# Patient Record
Sex: FEMALE | Race: WHITE
[De-identification: names, ages, dates, MRNs, and addresses within clinical notes are randomized per-mention and may not be internally consistent; named-entity substitution may affect disease eponyms.]

---

## 2018-07-20 ENCOUNTER — APPOINTMENT (OUTPATIENT)
Dept: OBGYN | Facility: CLINIC | Age: 28
End: 2018-07-20
Payer: MEDICAID

## 2018-07-20 VITALS
HEIGHT: 65 IN | BODY MASS INDEX: 22.49 KG/M2 | WEIGHT: 135 LBS | SYSTOLIC BLOOD PRESSURE: 109 MMHG | DIASTOLIC BLOOD PRESSURE: 64 MMHG

## 2018-07-20 DIAGNOSIS — Z78.9 OTHER SPECIFIED HEALTH STATUS: ICD-10-CM

## 2018-07-20 DIAGNOSIS — Z83.3 FAMILY HISTORY OF DIABETES MELLITUS: ICD-10-CM

## 2018-07-20 DIAGNOSIS — Z87.42 PERSONAL HISTORY OF OTHER DISEASES OF THE FEMALE GENITAL TRACT: ICD-10-CM

## 2018-07-20 DIAGNOSIS — Z82.49 FAMILY HISTORY OF ISCHEMIC HEART DISEASE AND OTHER DISEASES OF THE CIRCULATORY SYSTEM: ICD-10-CM

## 2018-07-20 LAB
BILIRUB UR QL STRIP: NORMAL
COLLECTION METHOD: NORMAL
GLUCOSE UR-MCNC: NORMAL
HCG UR QL: 0.2 EU/DL
HGB UR QL STRIP.AUTO: ABNORMAL
KETONES UR-MCNC: NORMAL
LEUKOCYTE ESTERASE UR QL STRIP: NORMAL
NITRITE UR QL STRIP: NORMAL
PH UR STRIP: 6
PROT UR STRIP-MCNC: ABNORMAL
SP GR UR STRIP: 1.02

## 2018-07-20 PROCEDURE — 81003 URINALYSIS AUTO W/O SCOPE: CPT | Mod: QW

## 2018-07-20 PROCEDURE — 99385 PREV VISIT NEW AGE 18-39: CPT

## 2018-07-23 LAB
C TRACH RRNA SPEC QL NAA+PROBE: NOT DETECTED
N GONORRHOEA RRNA SPEC QL NAA+PROBE: NOT DETECTED
SOURCE TP AMPLIFICATION: NORMAL

## 2018-07-25 LAB — CYTOLOGY CVX/VAG DOC THIN PREP: NORMAL

## 2020-02-11 ENCOUNTER — APPOINTMENT (OUTPATIENT)
Dept: OBGYN | Facility: CLINIC | Age: 30
End: 2020-02-11
Payer: MEDICAID

## 2020-02-11 VITALS
HEIGHT: 65 IN | SYSTOLIC BLOOD PRESSURE: 100 MMHG | DIASTOLIC BLOOD PRESSURE: 60 MMHG | WEIGHT: 138 LBS | BODY MASS INDEX: 22.99 KG/M2

## 2020-02-11 DIAGNOSIS — N39.0 URINARY TRACT INFECTION, SITE NOT SPECIFIED: ICD-10-CM

## 2020-02-11 PROCEDURE — 81003 URINALYSIS AUTO W/O SCOPE: CPT | Mod: QW

## 2020-02-11 PROCEDURE — 99213 OFFICE O/P EST LOW 20 MIN: CPT

## 2020-02-11 NOTE — PHYSICAL EXAM
[Normal] : uterus [Motion Tenderness] : there was cervical motion tenderness [No Bleeding] : there was no active vaginal bleeding [Uterine Adnexae] : were not tender and not enlarged [Enlarged ___ wks] : not enlarged [Tenderness] : nontender [Adnexa Tenderness] : were not tender [de-identified] : NO CVAT, ABDOMEN SOFT WITH SUPRAPUBIC TENDERNESS, FULL BLADDER

## 2020-02-11 NOTE — CHIEF COMPLAINT
[Urgent Visit] : Urgent Visit [FreeTextEntry1] : SEEN AT URGENT CARE 2 DAYS AGO WITH DARK BROWN, "DIRTY" URINE. GIVEN PO KEFLEX, PYRIDIUM, STARTED YESTERDAY MORNING.  TAKING AZO AS WELL.  SOME RELIEF.  GROSS HEMATURIA AT END OF BLOOD STREAM.\par \par IS AT MID-CYCLE.  DENIES POLYDYPSIA, POLYURIA, POLYPHAGIA.  \par \par GLUCOSURIA TODAY.  HASN'T HAD SEX IN ABOUT 10 DAYS.

## 2020-02-13 LAB
APPEARANCE: CLEAR
BACTERIA UR CULT: NORMAL
BACTERIA: NEGATIVE
BILIRUB UR QL STRIP: NORMAL
BILIRUBIN URINE: ABNORMAL
BLOOD URINE: NEGATIVE
CLARITY UR: CLEAR
COLLECTION METHOD: NORMAL
COLOR: ABNORMAL
GLUCOSE QUALITATIVE U: NEGATIVE
GLUCOSE UR-MCNC: NORMAL
HCG UR QL: 1 EU/DL
HGB UR QL STRIP.AUTO: NORMAL
HYALINE CASTS: 2 /LPF
KETONES UR-MCNC: NORMAL
KETONES URINE: NEGATIVE
LEUKOCYTE ESTERASE UR QL STRIP: NORMAL
LEUKOCYTE ESTERASE URINE: NEGATIVE
MICROSCOPIC-UA: NORMAL
NITRITE UR QL STRIP: POSITIVE
NITRITE URINE: POSITIVE
PH UR STRIP: 5.5
PH URINE: 6.5
PROT UR STRIP-MCNC: NORMAL
PROTEIN URINE: NEGATIVE
RED BLOOD CELLS URINE: 1 /HPF
SP GR UR STRIP: 1.02
SPECIFIC GRAVITY URINE: 1.01
SQUAMOUS EPITHELIAL CELLS: 1 /HPF
UROBILINOGEN URINE: ABNORMAL
WHITE BLOOD CELLS URINE: 0 /HPF

## 2020-03-20 ENCOUNTER — APPOINTMENT (OUTPATIENT)
Dept: OBGYN | Facility: CLINIC | Age: 30
End: 2020-03-20

## 2020-09-23 ENCOUNTER — APPOINTMENT (OUTPATIENT)
Dept: OBGYN | Facility: CLINIC | Age: 30
End: 2020-09-23
Payer: MEDICAID

## 2020-09-23 VITALS
HEIGHT: 65 IN | WEIGHT: 128 LBS | SYSTOLIC BLOOD PRESSURE: 108 MMHG | DIASTOLIC BLOOD PRESSURE: 64 MMHG | BODY MASS INDEX: 21.33 KG/M2

## 2020-09-23 DIAGNOSIS — N90.89 OTHER SPECIFIED NONINFLAMMATORY DISORDERS OF VULVA AND PERINEUM: ICD-10-CM

## 2020-09-23 DIAGNOSIS — Z01.419 ENCOUNTER FOR GYNECOLOGICAL EXAMINATION (GENERAL) (ROUTINE) W/OUT ABNORMAL FINDINGS: ICD-10-CM

## 2020-09-23 LAB
HCG UR QL: NEGATIVE
QUALITY CONTROL: YES

## 2020-09-23 PROCEDURE — 99395 PREV VISIT EST AGE 18-39: CPT

## 2020-09-23 PROCEDURE — 81025 URINE PREGNANCY TEST: CPT

## 2020-09-23 RX ORDER — PHENAZOPYRIDINE 200 MG/1
TABLET, FILM COATED ORAL
Refills: 0 | Status: COMPLETED | COMMUNITY
End: 2020-09-23

## 2020-09-23 RX ORDER — TOBRAMYCIN 3 MG/ML
0.3 SOLUTION/ DROPS OPHTHALMIC
Qty: 5 | Refills: 0 | Status: COMPLETED | COMMUNITY
Start: 2018-06-27 | End: 2020-09-23

## 2020-09-23 RX ORDER — DEXAMETHASONE 4 MG/1
TABLET ORAL
Refills: 0 | Status: COMPLETED | COMMUNITY
End: 2020-09-23

## 2020-09-23 RX ORDER — DEXTROAMPHETAMINE SACCHARATE, AMPHETAMINE ASPARTATE, DEXTROAMPHETAMINE SULFATE, AND AMPHETAMINE SULFATE 2.5; 2.5; 2.5; 2.5 MG/1; MG/1; MG/1; MG/1
10 TABLET ORAL
Refills: 0 | Status: ACTIVE | COMMUNITY
Start: 2020-09-23

## 2020-09-23 RX ORDER — PODOFILOX 5 MG/G
0.5 GEL TOPICAL TWICE DAILY
Qty: 1 | Refills: 1 | Status: ACTIVE | COMMUNITY
Start: 2020-09-23 | End: 1900-01-01

## 2020-09-23 RX ORDER — ETONOGESTREL AND ETHINYL ESTRADIOL .12; .015 MG/D; MG/D
0.12-0.015 INSERT, EXTENDED RELEASE VAGINAL
Qty: 3 | Refills: 0 | Status: COMPLETED | COMMUNITY
Start: 2018-07-20 | End: 2020-09-23

## 2020-09-23 NOTE — PLAN
[FreeTextEntry1] : OFFERED BLOOD WORK FOR STI\par PRIMARY IS SENDING HER FOR STI PANEL, PER PT\par \par BREASTS ARE VERY LUMPY, THROUGHOUT\par ALLMOBILE, NO REDNESS OR RETRACTION

## 2020-09-23 NOTE — COUNSELING
[Nutrition/ Exercise/ Weight Management] : nutrition, exercise, weight management [Vitamins/Supplements] : vitamins/supplements [Sunscreen] : sunscreen [Breast Self Exam] : breast self exam [Contraception/ Emergency Contraception/ Safe Sexual Practices] : contraception, emergency contraception, safe sexual practices [STD (testing, results, tx)] : STD (testing, results, tx) [Vaccines] : vaccines

## 2020-09-23 NOTE — PHYSICAL EXAM
[Appropriately responsive] : appropriately responsive [Alert] : alert [No Acute Distress] : no acute distress [No Lymphadenopathy] : no lymphadenopathy [Regular Rate Rhythm] : regular rate rhythm [No Murmurs] : no murmurs [Clear to Auscultation B/L] : clear to auscultation bilaterally [Soft] : soft [Non-tender] : non-tender [Non-distended] : non-distended [No HSM] : No HSM [No Lesions] : no lesions [No Mass] : no mass [Oriented x3] : oriented x3 [Examination Of The Breasts] : a normal appearance [No Masses] : no breast masses were palpable [Labia Majora] : normal [Labia Minora] : normal [Normal] : normal [Uterine Adnexae] : normal [Breast Palpation Diffuse Fibrous Tissue Bilateral] : fibrocystic changes [FreeTextEntry1] : JUST ABOVE CLIT ON RIGHT SIDE SMALL WART OR SKIN TAG ON PEDICLE LESS THAN 1 CM

## 2020-09-23 NOTE — HISTORY OF PRESENT ILLNESS
[Patient reported PAP Smear was normal] : Patient reported PAP Smear was normal [FreeTextEntry1] : Come concern with utis this past year, has had about 4.\par Has no symptoms today\par For annual, pap and would like skin tag checked which she things has changed recently\par hAD UNPROTECTED INTERCOURSE ABOUT 2 WEEKS AGO AND IS CONCERNED. [TextBox_4] : 29 YO FEMALE,HERE FOR ANNUAL EXAM   HAS BEEN WELL SINCE LAST SEEN OVER 2 YEARS AGO.  IS NO LONGER ON NUVARING, USES CONDOMS INTERMITTENTLY,FOR PREGNANCY PREVENTION\par HER LAST ANNUAL EXAM WAS: 7/20/18\par \par PREGNANCY HISTORY:  TOTAL   1   LIVE BIRTHS:      SAB:      IAB:1\par \par SEXUALLY ACTIVE: YES\par LENGTH OF TIME IN RELATIONSHIP:  PT OUT OF LONG TERM RELATIONSHIP, HAS HAD 3 INTERVAL PARTNERS, LAST INTERCOURSE 2 WEEKS AGO, UNPROTECTED.  USED EC\par \par MEDICAL HISTORY INCLUDES: ADHD\par FAMILY HISTORY IS SIGNIFICANT FOR: HYPOTHYROIDISM, DIABETES\par \par LAST VISIT WITH PRIMARY DOCTOR: AUG 2020\par LAST BLOOD WORK: BEING SENT FOR BLOOD WORK\par \par NUTRITIONAL INFO:TRIES TO EAT HEALTHFULLY, HARDER SINCE WORKING NIGHTS.  HAS YOGURT DAILY, AND CHEESE REGULARLY\par CALCIUM INTAKE:SUPPLEMENTS: NO\par \par \par EXERCISES: WALKS DOG REGULARLY, ON FEET ALOT, IS LPN.\par  [PapSmeardate] : 7/20/18 [GonorrheaDate] : 7/20/18 [ChlamydiaDate] : 7/20/18

## 2020-09-28 LAB — CYTOLOGY CVX/VAG DOC THIN PREP: ABNORMAL

## 2020-10-01 DIAGNOSIS — Z30.011 ENCOUNTER FOR INITIAL PRESCRIPTION OF CONTRACEPTIVE PILLS: ICD-10-CM

## 2020-12-04 DIAGNOSIS — Z30.015 ENCOUNTER FOR INITIAL PRESCRIPTION OF VAGINAL RING HORMONAL CONTRACEPTIVE: ICD-10-CM

## 2020-12-04 RX ORDER — NORGESTIMATE AND ETHINYL ESTRADIOL 7DAYSX3 LO
0.18/0.215/0.25 KIT ORAL
Qty: 84 | Refills: 1 | Status: COMPLETED | COMMUNITY
Start: 2020-10-01 | End: 2020-12-04

## 2020-12-04 RX ORDER — ETONOGESTREL AND ETHINYL ESTRADIOL 11.7; 2.7 MG/1; MG/1
0.12-0.015 INSERT, EXTENDED RELEASE VAGINAL
Qty: 1 | Refills: 1 | Status: ACTIVE | COMMUNITY
Start: 2020-12-04 | End: 1900-01-01

## 2020-12-23 PROBLEM — N39.0 ACUTE UTI: Status: RESOLVED | Noted: 2020-02-11 | Resolved: 2020-12-23

## 2021-01-25 ENCOUNTER — APPOINTMENT (OUTPATIENT)
Dept: OBGYN | Facility: CLINIC | Age: 31
End: 2021-01-25
Payer: MEDICAID

## 2021-01-25 VITALS
TEMPERATURE: 97.9 F | WEIGHT: 124 LBS | SYSTOLIC BLOOD PRESSURE: 112 MMHG | HEIGHT: 65 IN | BODY MASS INDEX: 20.66 KG/M2 | DIASTOLIC BLOOD PRESSURE: 62 MMHG

## 2021-01-25 DIAGNOSIS — N92.6 IRREGULAR MENSTRUATION, UNSPECIFIED: ICD-10-CM

## 2021-01-25 LAB
HCG UR QL: NEGATIVE
QUALITY CONTROL: YES

## 2021-01-25 PROCEDURE — 99072 ADDL SUPL MATRL&STAF TM PHE: CPT

## 2021-01-25 PROCEDURE — 99212 OFFICE O/P EST SF 10 MIN: CPT

## 2021-01-25 PROCEDURE — 81025 URINE PREGNANCY TEST: CPT

## 2021-01-25 RX ORDER — FLUTICASONE PROPIONATE 50 UG/1
50 SPRAY, METERED NASAL
Qty: 16 | Refills: 0 | Status: COMPLETED | COMMUNITY
Start: 2020-10-16

## 2021-01-25 RX ORDER — TERCONAZOLE 4 MG/G
0.4 CREAM VAGINAL
Qty: 45 | Refills: 0 | Status: COMPLETED | COMMUNITY
Start: 2020-10-26

## 2021-01-25 RX ORDER — FLUCONAZOLE 150 MG/1
150 TABLET ORAL
Qty: 2 | Refills: 0 | Status: COMPLETED | COMMUNITY
Start: 2020-10-26

## 2021-01-25 NOTE — PLAN
[FreeTextEntry1] : Will await results of affirm to treat\par Continue probiotics, nonlubricated condom

## 2021-01-25 NOTE — HISTORY OF PRESENT ILLNESS
[Patient reported PAP Smear was normal] : Patient reported PAP Smear was normal [Vagina] : vagina [VV Cream] : VV cream [Irregular Menstrual Interval] : irregular menstrual interval [FreeTextEntry1] : Pt has been with new partner since August [TextBox_4] : 31 YO FEMALE HAD ANNUAL EXAM DONE 9/23/20   sHE IS USING nUVARING [PapSmeardate] : 9/23/20 [GonorrheaDate] : 9/23/20 [ChlamydiaDate] : 9/23/20 [TextBox_36] : Pt states that discharge and odor started in Dec.  symptoms are on and off, has used boric acid twice with good relieft.  pt feels pH is off\par  She had terrible yeast infection in Nov. [TextBox_28] : Periods were monthly and regular until pt started ocp in Sept.  Oct menses were wnl, and in Nov got it Nov 9-24.  Called and stopped the pills\par Dec menses Dec 18-21, Jan 8-12   has been using condoms regularly.  never started nuvaring

## 2021-01-25 NOTE — REASON FOR VISIT
[Follow-Up] : a follow-up evaluation of [FreeTextEntry2] : irregular menses since nov 2020, foul odor per vagiona

## 2021-01-25 NOTE — REVIEW OF SYSTEMS
[Patient Intake Form Reviewed] : Patient intake form was reviewed [Negative] : Heme/Lymph [Abn Vaginal bleeding] : abnormal vaginal bleeding [Urgency] : no urgency [Frequency] : no frequency [Urethral Discharge] : no urethral discharge [Pelvic pain] : no pelvic pain [FreeTextEntry8] : foul odor, discharge

## 2021-01-28 PROBLEM — N76.0 BV (BACTERIAL VAGINOSIS): Status: ACTIVE | Noted: 2021-01-28

## 2021-01-28 LAB
C TRACH RRNA SPEC QL NAA+PROBE: NOT DETECTED
CANDIDA VAG CYTO: NOT DETECTED
G VAGINALIS+PREV SP MTYP VAG QL MICRO: DETECTED
N GONORRHOEA RRNA SPEC QL NAA+PROBE: NOT DETECTED
SOURCE AMPLIFICATION: NORMAL
T VAGINALIS VAG QL WET PREP: NOT DETECTED

## 2021-02-03 ENCOUNTER — APPOINTMENT (OUTPATIENT)
Dept: OBGYN | Facility: CLINIC | Age: 31
End: 2021-02-03
Payer: MEDICAID

## 2021-02-03 DIAGNOSIS — B96.89 ACUTE VAGINITIS: ICD-10-CM

## 2021-02-03 DIAGNOSIS — N76.0 ACUTE VAGINITIS: ICD-10-CM

## 2021-02-12 ENCOUNTER — ASOB RESULT (OUTPATIENT)
Age: 31
End: 2021-02-12

## 2021-02-12 ENCOUNTER — APPOINTMENT (OUTPATIENT)
Dept: OBGYN | Facility: CLINIC | Age: 31
End: 2021-02-12
Payer: MEDICAID

## 2021-02-12 PROCEDURE — 99072 ADDL SUPL MATRL&STAF TM PHE: CPT

## 2021-02-12 PROCEDURE — 76830 TRANSVAGINAL US NON-OB: CPT

## 2021-02-18 ENCOUNTER — NON-APPOINTMENT (OUTPATIENT)
Age: 31
End: 2021-02-18

## 2022-10-24 ENCOUNTER — NON-APPOINTMENT (OUTPATIENT)
Age: 32
End: 2022-10-24

## 2023-04-18 ENCOUNTER — APPOINTMENT (OUTPATIENT)
Dept: OBGYN | Facility: CLINIC | Age: 33
End: 2023-04-18

## 2023-05-28 ENCOUNTER — NON-APPOINTMENT (OUTPATIENT)
Age: 33
End: 2023-05-28

## 2023-09-09 ENCOUNTER — NON-APPOINTMENT (OUTPATIENT)
Age: 33
End: 2023-09-09

## 2023-09-12 ENCOUNTER — APPOINTMENT (OUTPATIENT)
Dept: DERMATOLOGY | Facility: CLINIC | Age: 33
End: 2023-09-12
Payer: COMMERCIAL

## 2023-09-12 VITALS — HEIGHT: 65 IN | BODY MASS INDEX: 21.99 KG/M2 | WEIGHT: 132 LBS

## 2023-09-12 DIAGNOSIS — L23.9 ALLERGIC CONTACT DERMATITIS, UNSPECIFIED CAUSE: ICD-10-CM

## 2023-09-12 PROCEDURE — 99203 OFFICE O/P NEW LOW 30 MIN: CPT

## 2023-09-14 RX ORDER — MOMETASONE FUROATE 1 MG/G
0.1 OINTMENT TOPICAL
Qty: 1 | Refills: 2 | Status: ACTIVE | COMMUNITY
Start: 2023-09-14 | End: 1900-01-01

## 2023-09-14 RX ORDER — TRIAMCINOLONE ACETONIDE 0.5 MG/G
0.05 OINTMENT TOPICAL
Qty: 30 | Refills: 0 | Status: DISCONTINUED | COMMUNITY
Start: 2023-09-12 | End: 2023-09-14

## 2023-10-23 ENCOUNTER — APPOINTMENT (OUTPATIENT)
Dept: OPHTHALMOLOGY | Facility: CLINIC | Age: 33
End: 2023-10-23
Payer: COMMERCIAL

## 2023-10-23 ENCOUNTER — NON-APPOINTMENT (OUTPATIENT)
Age: 33
End: 2023-10-23

## 2023-10-23 PROCEDURE — 92025 CPTRIZED CORNEAL TOPOGRAPHY: CPT

## 2023-10-23 PROCEDURE — 92004 COMPRE OPH EXAM NEW PT 1/>: CPT

## 2023-11-03 ENCOUNTER — RESULT REVIEW (OUTPATIENT)
Age: 33
End: 2023-11-03

## 2023-11-03 ENCOUNTER — APPOINTMENT (OUTPATIENT)
Dept: MRI IMAGING | Facility: CLINIC | Age: 33
End: 2023-11-03
Payer: COMMERCIAL

## 2023-11-03 PROCEDURE — A9585: CPT

## 2023-11-03 PROCEDURE — 70543 MRI ORBT/FAC/NCK W/O &W/DYE: CPT

## 2023-11-07 ENCOUNTER — NON-APPOINTMENT (OUTPATIENT)
Age: 33
End: 2023-11-07

## 2023-11-07 ENCOUNTER — APPOINTMENT (OUTPATIENT)
Dept: OPHTHALMOLOGY | Facility: CLINIC | Age: 33
End: 2023-11-07
Payer: COMMERCIAL

## 2023-11-07 PROCEDURE — 92285 EXTERNAL OCULAR PHOTOGRAPHY: CPT

## 2023-11-07 PROCEDURE — 99214 OFFICE O/P EST MOD 30 MIN: CPT

## 2023-11-17 ENCOUNTER — NON-APPOINTMENT (OUTPATIENT)
Age: 33
End: 2023-11-17

## 2023-12-14 ENCOUNTER — APPOINTMENT (OUTPATIENT)
Dept: OBGYN | Facility: CLINIC | Age: 33
End: 2023-12-14

## 2023-12-15 ENCOUNTER — APPOINTMENT (OUTPATIENT)
Dept: OPHTHALMOLOGY | Facility: CLINIC | Age: 33
End: 2023-12-15

## 2024-02-09 ENCOUNTER — APPOINTMENT (OUTPATIENT)
Dept: OPHTHALMOLOGY | Facility: CLINIC | Age: 34
End: 2024-02-09
Payer: COMMERCIAL

## 2024-02-09 ENCOUNTER — NON-APPOINTMENT (OUTPATIENT)
Age: 34
End: 2024-02-09

## 2024-02-09 PROCEDURE — 99213 OFFICE O/P EST LOW 20 MIN: CPT

## 2024-02-09 PROCEDURE — 92285 EXTERNAL OCULAR PHOTOGRAPHY: CPT

## 2024-02-23 ENCOUNTER — NON-APPOINTMENT (OUTPATIENT)
Age: 34
End: 2024-02-23

## 2024-02-23 ENCOUNTER — APPOINTMENT (OUTPATIENT)
Dept: OPHTHALMOLOGY | Facility: CLINIC | Age: 34
End: 2024-02-23
Payer: COMMERCIAL

## 2024-02-23 PROCEDURE — 92285 EXTERNAL OCULAR PHOTOGRAPHY: CPT

## 2024-02-23 PROCEDURE — 99215 OFFICE O/P EST HI 40 MIN: CPT

## 2024-03-18 ENCOUNTER — APPOINTMENT (OUTPATIENT)
Dept: OPHTHALMOLOGY | Facility: HOSPITAL | Age: 34
End: 2024-03-18

## 2024-03-26 ENCOUNTER — APPOINTMENT (OUTPATIENT)
Dept: OPHTHALMOLOGY | Facility: CLINIC | Age: 34
End: 2024-03-26

## 2024-03-29 ENCOUNTER — NON-APPOINTMENT (OUTPATIENT)
Age: 34
End: 2024-03-29

## 2024-03-29 ENCOUNTER — APPOINTMENT (OUTPATIENT)
Dept: OBGYN | Facility: CLINIC | Age: 34
End: 2024-03-29
Payer: COMMERCIAL

## 2024-03-29 VITALS
BODY MASS INDEX: 22.03 KG/M2 | WEIGHT: 132.25 LBS | HEIGHT: 65 IN | SYSTOLIC BLOOD PRESSURE: 96 MMHG | DIASTOLIC BLOOD PRESSURE: 52 MMHG

## 2024-03-29 DIAGNOSIS — N89.8 OTHER SPECIFIED NONINFLAMMATORY DISORDERS OF VAGINA: ICD-10-CM

## 2024-03-29 DIAGNOSIS — Z12.4 ENCOUNTER FOR SCREENING FOR MALIGNANT NEOPLASM OF CERVIX: ICD-10-CM

## 2024-03-29 DIAGNOSIS — Z11.3 ENCOUNTER FOR SCREENING FOR INFECTIONS WITH A PREDOMINANTLY SEXUAL MODE OF TRANSMISSION: ICD-10-CM

## 2024-03-29 DIAGNOSIS — N63.10 UNSPECIFIED LUMP IN THE RIGHT BREAST, UNSPECIFIED QUADRANT: ICD-10-CM

## 2024-03-29 DIAGNOSIS — Z01.411 ENCOUNTER FOR GYNECOLOGICAL EXAMINATION (GENERAL) (ROUTINE) WITH ABNORMAL FINDINGS: ICD-10-CM

## 2024-03-29 PROCEDURE — 99385 PREV VISIT NEW AGE 18-39: CPT

## 2024-03-29 PROCEDURE — 36415 COLL VENOUS BLD VENIPUNCTURE: CPT

## 2024-03-29 PROCEDURE — 99203 OFFICE O/P NEW LOW 30 MIN: CPT | Mod: 25

## 2024-03-29 NOTE — PLAN
[FreeTextEntry1] : Right Breast Masses:  Script for diagnostic mammogram given to patient.  Pt was recommended to have a diagnostic mammogram and sonogram to further evaluate the palpable nodules.  We discussed potential outcomes of imaging including benign findings, findings which require close interval follow up, suspicious findings which require biopsy, and also the possibility the imaging will not identify the nodule.  We discussed the need for breast surgeon follow up pending results of the imaging. Will follow up with patient on results.  Pt verbalizes understanding.     Cervical Screening:  Pap smear completed. Will follow up with patient on results.   STI testing:  Gonorrhea/ chlamydia from Pap.  Blood work for Hep B, syphilis, HIV.  Will follow up with patient on results.   Odor:  BV panel sent.  Will follow up with patient on results.    Educated pt on importance of healthy diet and exercise.   Declined Gardasil vaccine.   All questions and concerns addressed.

## 2024-03-29 NOTE — PHYSICAL EXAM
[Chaperone Present] : A chaperone was present in the examining room during all aspects of the physical examination [Appropriately responsive] : appropriately responsive [Mass] : mass [Non-tender] : non-tender [Non-distended] : non-distended [Oriented x3] : oriented x3 [Examination Of The Breasts] : a normal appearance [] : in the 9:00 position [___cm] : a ~M [unfilled] ~Ucm superior medial quadrant mass was palpated [Deep] : deep [Soft] : soft [Mobile] : mobile [Nontender] : nontender [12:00] : in the 12:00 position [___cm Radial Distance from the Nipple] : [unfilled] ~Ucm radially from the nipple [Labia Majora] : normal [Labia Minora] : normal [Discharge] : discharge [Moderate] : moderate [White] : white [Thick] : thick [Normal] : normal [Normal Position] : in a normal position [Uterine Adnexae] : normal [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

## 2024-03-29 NOTE — HISTORY OF PRESENT ILLNESS
[Y] : Positive pregnancy history [Menarche Age: ____] : age at menarche was [unfilled] [No] : Patient does not have concerns regarding sex [N] : Patient is not sexually active [Previously active] : previously active [PapSmeardate] : 09/23/2020 [TextBox_31] : NEGATIVE [GonorrheaDate] : 01/25/2021 [TextBox_63] : NEGATIVE [ChlamydiaDate] : 01/25/2021 [TextBox_68] : NEGATIVE [LMPDate] : 03/09/2024 [PGHxTotal] : 2 [PGHxABInduced] : 2 [FreeTextEntry1] : 03/09/2024

## 2024-03-30 LAB
CANDIDA VAG CYTO: NOT DETECTED
G VAGINALIS+PREV SP MTYP VAG QL MICRO: DETECTED
HAV IGM SER QL: NONREACTIVE
HBV CORE IGM SER QL: NONREACTIVE
HBV SURFACE AG SER QL: NONREACTIVE
HCV AB SER QL: NONREACTIVE
HCV S/CO RATIO: 0.09 S/CO
HIV1+2 AB SPEC QL IA.RAPID: NONREACTIVE
T PALLIDUM AB SER QL IA: NEGATIVE
T VAGINALIS VAG QL WET PREP: NOT DETECTED

## 2024-04-03 RX ORDER — METRONIDAZOLE 7.5 MG/G
0.75 GEL VAGINAL
Qty: 1 | Refills: 0 | Status: ACTIVE | COMMUNITY
Start: 2021-01-28

## 2024-04-10 LAB
C TRACH RRNA SPEC QL NAA+PROBE: NOT DETECTED
CYTOLOGY CVX/VAG DOC THIN PREP: ABNORMAL
HPV HIGH+LOW RISK DNA PNL CVX: NOT DETECTED
N GONORRHOEA RRNA SPEC QL NAA+PROBE: NOT DETECTED
SOURCE TP AMPLIFICATION: NORMAL

## 2024-04-18 ENCOUNTER — APPOINTMENT (OUTPATIENT)
Dept: OTOLARYNGOLOGY | Facility: CLINIC | Age: 34
End: 2024-04-18

## 2024-05-01 ENCOUNTER — APPOINTMENT (OUTPATIENT)
Dept: OTOLARYNGOLOGY | Facility: CLINIC | Age: 34
End: 2024-05-01
Payer: COMMERCIAL

## 2024-05-01 ENCOUNTER — NON-APPOINTMENT (OUTPATIENT)
Age: 34
End: 2024-05-01

## 2024-05-01 VITALS — WEIGHT: 132 LBS | HEIGHT: 65 IN | BODY MASS INDEX: 21.99 KG/M2

## 2024-05-01 DIAGNOSIS — H57.89 OTHER SPECIFIED DISORDERS OF EYE AND ADNEXA: ICD-10-CM

## 2024-05-01 PROCEDURE — 31231 NASAL ENDOSCOPY DX: CPT

## 2024-05-01 PROCEDURE — 99204 OFFICE O/P NEW MOD 45 MIN: CPT | Mod: 25

## 2024-05-01 RX ORDER — MOMETASONE FUROATE 100 %
POWDER (GRAM) MISCELLANEOUS
Qty: 90 | Refills: 3 | Status: ACTIVE | COMMUNITY
Start: 2024-05-01 | End: 1900-01-01

## 2024-05-01 NOTE — PHYSICAL EXAM
[Nasal Endoscopy Performed] : nasal endoscopy was performed, see procedure section for findings [] : septum deviated to the right [Midline] : trachea located in midline position [Normal] : normal appearance, well groomed, well nourished, and in no acute distress [de-identified] : The bilateral inferior nasal turbinates are moderately hypertrophic and edematous at baseline, causing moderate obstruction to the nasal cavity [de-identified] : hypertrophic bilaterally  [de-identified] : soft tissue upper and lower lid swelling

## 2024-05-01 NOTE — END OF VISIT
[FreeTextEntry3] : I personally saw and examined Ms. CAMPBELL RIVERA in detail this visit today. I personally reviewed the HPI, PMH, FH, SH, ROS and medications/allergies. I have spoken to KAI Rangel ([place]) regarding the history and have personally determined the assessment and plan of care, and documented this myself. I was present and participated in all key portions of the encounter and all procedures noted above. I have made changes in the body of the note where appropriate.  Attesting Faculty: See Attending Signature Below

## 2024-05-01 NOTE — REVIEW OF SYSTEMS
[Seasonal Allergies] : seasonal allergies [Nasal Congestion] : nasal congestion [Sinus Pressure] : sinus pressure [As Noted in HPI] : as noted in HPI

## 2024-05-01 NOTE — DATA REVIEWED
Reviewed natural history. [de-identified] : MRI orbits : multifocal inflammatory paranasal sinus disease

## 2024-05-01 NOTE — CONSULT LETTER
[Dear  ___] : Dear  [unfilled], [Consult Letter:] : I had the pleasure of evaluating your patient, [unfilled]. [Please see my note below.] : Please see my note below. [Consult Closing:] : Thank you very much for allowing me to participate in the care of this patient.  If you have any questions, please do not hesitate to contact me. [Sincerely,] : Sincerely, [FreeTextEntry3] : Vika Mike MD Otolaryngology and Skull base Surgery [facility]

## 2024-05-01 NOTE — ASSESSMENT
[FreeTextEntry1] : 34 yo female with chronic nasal congestion and eye swelling, having lacrimal gland biopsy with Dr Moy  nasal congestion and sinus pressure - continue nasal sprays (Nasacort) and allergy medication - MRI Orbits does show ethmoid and maxillary sinusitis, ordering CT sinus for full sinus eval, will call with results  - recommended sinus rinses with mometasone (prescribed to [facility]) daily  eye swelling - unsure if allergy related, or related to sinuses  - continue follow up with oculoplastics and planned lacrimal surgery  tonsils - tonsil hypertrophy - if continued tonsillitis can discuss tonsillectomy but discussed painful 2 week recovery  follow up 4-6 weeks to reassess sinuses

## 2024-05-01 NOTE — HISTORY OF PRESENT ILLNESS
[de-identified] : 32 yo female who for the last few years patient has had progressively worsening issues with the sinuses. She started to have eye involvement: eye swelling when she has a sinus infection. She did see an oculoplastic doctor (Dr Moy) and got an MRI of the orbit due to the swelling and lacrimal duct obstruction (planning to have surgery for this) but wants to know if her sinuses are related. Also had full blood test/autimmune workup- negative  She does get frequent sinus infections and nasal congestion, especially right sided, continuously, using nasal sprays continuously. She get moderate sinus pressure under the eyes bilaterally and in the nose  She has had tonsil issues, frequent strep infections. The last strep in November 2023 the tonsils were so swollen she needed injection of steroids and antibiotics  She has never been officially tested for seasonal allergies but suspects

## 2024-05-01 NOTE — PROCEDURE
[FreeTextEntry6] : Nasal Endoscopy: nasal congestion. Flexible scope #38 was used. Right nasal passage with hypertrophy of inferior, middle and superior turbinates. Nasal passage patent with clear middle meatus and sphenoethmoid recess. Left nasal passage with hypertrophy of inferior, middle and superior turbinates. Nasal passage was patent with clear middle meatus and sphenoethmoid recess. No mucopurulence or polyps appreciated. Nasopharynx clear.

## 2024-05-13 ENCOUNTER — APPOINTMENT (OUTPATIENT)
Dept: CT IMAGING | Facility: CLINIC | Age: 34
End: 2024-05-13
Payer: COMMERCIAL

## 2024-05-13 PROCEDURE — 70486 CT MAXILLOFACIAL W/O DYE: CPT

## 2024-05-16 ENCOUNTER — NON-APPOINTMENT (OUTPATIENT)
Age: 34
End: 2024-05-16

## 2024-05-16 DIAGNOSIS — J01.01 ACUTE RECURRENT MAXILLARY SINUSITIS: ICD-10-CM

## 2024-05-16 RX ORDER — AMOXICILLIN AND CLAVULANATE POTASSIUM 500; 125 MG/1; MG/1
500-125 TABLET, FILM COATED ORAL
Qty: 20 | Refills: 0 | Status: ACTIVE | COMMUNITY
Start: 2024-05-16 | End: 1900-01-01

## 2024-05-16 RX ORDER — PREDNISONE 10 MG/1
10 TABLET ORAL
Qty: 15 | Refills: 0 | Status: ACTIVE | COMMUNITY
Start: 2024-05-16 | End: 1900-01-01

## 2024-06-12 ENCOUNTER — APPOINTMENT (OUTPATIENT)
Dept: OTOLARYNGOLOGY | Facility: CLINIC | Age: 34
End: 2024-06-12
Payer: COMMERCIAL

## 2024-06-12 VITALS — HEIGHT: 65 IN | BODY MASS INDEX: 21.99 KG/M2 | WEIGHT: 132 LBS

## 2024-06-12 DIAGNOSIS — R09.81 NASAL CONGESTION: ICD-10-CM

## 2024-06-12 DIAGNOSIS — J03.91 ACUTE RECURRENT TONSILLITIS, UNSPECIFIED: ICD-10-CM

## 2024-06-12 DIAGNOSIS — J32.9 CHRONIC SINUSITIS, UNSPECIFIED: ICD-10-CM

## 2024-06-12 PROCEDURE — 31231 NASAL ENDOSCOPY DX: CPT

## 2024-06-12 PROCEDURE — 99213 OFFICE O/P EST LOW 20 MIN: CPT | Mod: 25

## 2024-06-12 NOTE — END OF VISIT
[FreeTextEntry3] : I personally saw and examined Ms. CAMPBELL RIVERA in detail this visit today. I personally reviewed the HPI, PMH, FH, SH, ROS and medications/allergies. I have spoken to KAI Ann regarding the history and have personally determined the assessment and plan of care, and documented this myself. I was present and participated in all key portions of the encounter and all procedures noted above. I have made changes in the body of the note where appropriate.   Attesting Faculty: See Attending Signature Below

## 2024-06-12 NOTE — HISTORY OF PRESENT ILLNESS
[de-identified] : 34 yo female who for the last few years patient has had progressively worsening issues with the sinuses. She started to have eye involvement: eye swelling when she has a sinus infection. She did see an oculoplastic doctor (Dr Moy) and got an MRI of the orbit due to the swelling and lacrimal duct obstruction (planning to have surgery for this) but wants to know if her sinuses are related. Also had full blood test/autimmune workup- negative  She does get frequent sinus infections and nasal congestion, especially right sided, continuously, using nasal sprays continuously. She get moderate sinus pressure under the eyes bilaterally and in the nose  She has had tonsil issues, frequent strep infections. The last strep in November 2023 the tonsils were so swollen she needed injection of steroids and antibiotics  She has never been officially tested for seasonal allergies but suspects  [FreeTextEntry1] : Today she is here for f/u. She states her congestion has improved along with the sinus pain/pressure after the abx and sinus rinses. She feels the rinse are helping her. She is still having eye swelling every morning. She states she still feels head fogginess and headaches

## 2024-06-12 NOTE — DATA REVIEWED
[de-identified] : Moderate to severe in paranasal sinuses and obstructive opacification of sinus outflow tracts. Fluid level and aerosolized secretion in the maxillary sinuses may reflect acute on chronic sinusitis. Clinical correlation is recommended.  No  polypoid in the nasal fossa.  Persistent asymmetric enlargement of the right lateral rectus and lacrimal gland as seen on prior MRI evaluation.

## 2024-06-12 NOTE — PHYSICAL EXAM
[Nasal Endoscopy Performed] : nasal endoscopy was performed, see procedure section for findings [Midline] : trachea located in midline position [] : septum deviated to the left [Normal] : external appearance is normal [de-identified] : The bilateral inferior nasal turbinates are moderately hypertrophic and edematous at baseline, causing moderate obstruction to the nasal cavity [de-identified] : hypertrophic bilaterally  [de-identified] : soft tissue upper and lower lid swelling

## 2024-06-12 NOTE — ASSESSMENT
[FreeTextEntry1] : 34 yo female with chronic nasal congestion and eye swelling, having lacrimal gland biopsy with Dr Moy  nasal congestion and sinus pressure -Nasal Endoscopy continues to show hypertrophy of the turbinates L>R. Left DNS and clear sinuses -Spoke to pt in depth about sinus surgery but will have her touch base with Dr. Moy first and will further discuss with her on a combined surgery - continue nasal sprays (Nasacort) and allergy medication -CT sinus reviewed and discussed with patient - can consider rescanning to determine if still with disease vs continue observation with medical management vs proceed with sx at time of Dr. Moy's surgery -continue sinus rinses with mometasone (prescribed to [facility]) daily as it is helping   eye swelling - unsure if allergy related, or related to sinuses  - continue follow up with oculoplastics and planned lacrimal surgery  tonsils - tonsil hypertrophy - if continued tonsillitis can discuss tonsillectomy but discussed painful 2 week recovery, pt is leaning more towards not getting surgery

## 2024-06-12 NOTE — PROCEDURE
[FreeTextEntry6] : Nasal Endoscopy: nasal congestion. Flexible scope #38 was used. Right nasal passage with mild hypertrophy of inferior, middle and superior turbinates. Nasal passage patent with clear middle meatus and sphenoethmoid recess. Left DNS. Left nasal passage with hypertrophy of inferior, middle and superior turbinates. Nasal passage was some puss noted in middle meatus and sphenoethmoid recess. No mucopurulence or polyps appreciated. Nasopharynx clear.

## 2024-06-28 ENCOUNTER — APPOINTMENT (OUTPATIENT)
Dept: OPHTHALMOLOGY | Facility: CLINIC | Age: 34
End: 2024-06-28

## 2024-07-02 NOTE — REVIEW OF SYSTEMS
Physical Therapy Visit    Visit Type: Progress Note  Visit: 10  Referring Provider: Blake Brandt DO  Medical Diagnosis (from order): S86.011D - Achilles tendon tear, right, subsequent encounter     SUBJECTIVE                                                                                                               Patient feels great today. He has done a lot of biking and has been very active. Feels stronger in the right ankle but still notices weakness compared to the left.   Current Functional Limitations: Sports with teens, running    Pain / Symptoms  - Pain rating (out of 10): Current: 0       OBJECTIVE                                                                                                                     Range of Motion (ROM)   (degrees unless noted; active unless noted; norms in ( ); negative=lacking to 0, positive=beyond 0)  Ankle:   - Dorsiflexion (20):       Right:  13     - Plantar Flexion (45-50):       Right:  37    - Inversion (30-35):      Right: 34   - Eversion (15-20):      Right: 15    Strength  (out of 5 unless noted, standard test position unless noted)   Hip:    - Abduction:         Right: 4+  Ankle:    - Dorsiflexion:         Right: 5    - Plantar Flexion:         Right: 4    - Inversion:         Right: 5    - Eversion:         Right: 4+                     Treatment     Therapeutic Exercise  3/6 ACHILLES TENDON   12 week 5/15    Eccentric heel raise stairs up with both down with right 2x10 (more drop noted right heel)  Heel raise (left foot on step) 2x10 right  Side lunge 2x10 each     Not today:  Heel raise sustained 3x30 sec   Shuttle single leg heel raise 3x10 1.5 C right   Gastrocnemius stretch (right) - 3x30\"       Manual Therapy   Instrument assisted soft tissue massage to incision scar  Right talocrural joint mobilizations (posterior, distraction) - Grade III-IV    Therapeutic Activity  Goblet squats with heel raise 2x10 30 lbs   Step up on bosu right 2x10   Agility ladder  (2 in each forward; side steps 2 in each; scissors; 2 in 2 out)  6 inch anterior and posterior step up and down 2x10 each   Single leg stance with red ball toss - 2x15 each trampoline   Balance board SLS 2x30 sec right only     Not today:  Forward walking lunges x4 laps           Skilled input: tactile instruction/cues, posture correction and verbal instruction/cues    Writer verbally educated and received verbal consent for hand placement, positioning of patient, and techniques to be performed today from patient for clothing adjustments for techniques and therapist position for techniques as described above and how they are pertinent to the patient's plan of care.  Home Exercise Program  Access Code: [email or web address]  URL: [email or web address]  Date: 07/02/2024  Prepared by: Fran Aquino    Program Notes  [email or web address] do arms seated, lat pull downs, core, bike, arm bike, laying hip exercises    Exercises  - Standing Gastroc Stretch  - 2-3 x daily - 7 x weekly - 1 sets - 3 reps - 30 seconds hold  - Standing Heel Raise with Support  - 1-2 x daily - 7 x weekly - 3 sets - 10 reps  - Supine Piriformis Stretch with Leg Straight  - 1-3 x daily - 7 x weekly - 1 sets - 3 reps - 30 seconds hold  - Squat  - 1 x daily - 4-7 x weekly - 3 sets - 10 reps  - Sidelying Hip Abduction  - 1 x daily - 5-7 x weekly - 2 sets - 15 reps  - Side Stepping with Resistance at Ankles  - 1 x daily - 5-7 x weekly - 3 sets  - Forward Monster Walks  - 1 x daily - 5-7 x weekly - 3 sets  - Upright Bike  - 1 x daily - 7 x weekly - 3 sets - 10 reps  - Full Leg Press  - 1 x daily - 5-7 x weekly - 3 sets - 10 reps  - Single Leg Stance on Foam Pad  - 1 x daily - 4-7 x weekly - 3 sets - 10 reps  - Lateral Lunge  - 1 x daily - 4 x weekly - 3 sets - 10 reps  - Heel Raises with Counter Support  - 1 x daily - 3-4 x weekly - 3 sets - 10 reps      ASSESSMENT                                                                                                           To date the patient has made gains as expected.  Patient will continue to benefit from skilled care as outlined.  Patient continues to have impairments and functional deficits as noted.    Patient demonstrates improved right ankle range of motion and strength. Some deficits still noted with right ankle plantarflexion and eversion strength. Progressed with agility ladder today. Patient continues to demonstrate heel drop due to decreased eccentric gastrocnemius muscle control single leg stance on agility ladder. Patient would continue to benefit from skilled physical therapy to improve right ankle strength, single leg eccentric control, and dynamic lower extremity movements.  Pain/symptoms after session (out of 10): 0  Education:   - Results of above outlined education: Verbalizes understanding and Demonstrates understanding    PLAN                                                                                                                          Continue interventions, frequency and duration established at initial evaluation.    Suggestions for next session as indicated: Progress per plan of care    Goals  Long Term Goals: to be met by end of plan of care  1. Patient will have 5/5 right hip abduction strength to decrease stresses on right foot with walking.  Status: not met Patient has 4+/5 right hip abduction strength today.   2. Patient will have dorsiflexion to 10 degrees to restore normal gait mechanics.  Status: met   3. Patient will be able to perform 20 single leg heel raises, 30 single leg hops and 10 single leg squats to start return to run.  Status: not met Patient has not started single leg hopping or single leg squats.  4. 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   Status: not met Patient has not started running yet.  5. Patient will score >60 on Lower extremity functional scale in order to show  functional improvements with skilled physical therapy intervention.  Status: not met Patient scored a 56/60 on last Lower extremity functional scale.       Therapy procedure time and total treatment time can be found documented on the Time Entry flowsheet     [Seasonal Allergies] : seasonal allergies [Nasal Congestion] : nasal congestion [Sinus Pressure] : sinus pressure [As Noted in HPI] : as noted in HPI [Negative] : Mouth and Throat

## 2024-07-22 ENCOUNTER — NON-APPOINTMENT (OUTPATIENT)
Age: 34
End: 2024-07-22

## 2024-07-22 ENCOUNTER — APPOINTMENT (OUTPATIENT)
Dept: OPHTHALMOLOGY | Facility: CLINIC | Age: 34
End: 2024-07-22

## 2024-07-22 PROCEDURE — 92285 EXTERNAL OCULAR PHOTOGRAPHY: CPT

## 2024-07-22 PROCEDURE — 99214 OFFICE O/P EST MOD 30 MIN: CPT

## 2024-08-19 ENCOUNTER — APPOINTMENT (OUTPATIENT)
Dept: OPHTHALMOLOGY | Facility: CLINIC | Age: 34
End: 2024-08-19

## 2024-09-18 ENCOUNTER — NON-APPOINTMENT (OUTPATIENT)
Age: 34
End: 2024-09-18

## 2024-09-23 ENCOUNTER — APPOINTMENT (OUTPATIENT)
Dept: OPHTHALMOLOGY | Facility: CLINIC | Age: 34
End: 2024-09-23
Payer: COMMERCIAL

## 2024-09-23 ENCOUNTER — NON-APPOINTMENT (OUTPATIENT)
Age: 34
End: 2024-09-23

## 2024-09-23 PROCEDURE — 99215 OFFICE O/P EST HI 40 MIN: CPT

## 2024-09-23 PROCEDURE — 92285 EXTERNAL OCULAR PHOTOGRAPHY: CPT

## 2024-11-05 ENCOUNTER — OUTPATIENT (OUTPATIENT)
Dept: OUTPATIENT SERVICES | Facility: HOSPITAL | Age: 34
LOS: 1 days | End: 2024-11-05
Payer: COMMERCIAL

## 2024-11-05 VITALS
TEMPERATURE: 98 F | RESPIRATION RATE: 16 BRPM | DIASTOLIC BLOOD PRESSURE: 75 MMHG | HEART RATE: 76 BPM | OXYGEN SATURATION: 99 % | HEIGHT: 65 IN | WEIGHT: 139.55 LBS | SYSTOLIC BLOOD PRESSURE: 112 MMHG

## 2024-11-05 DIAGNOSIS — Z01.818 ENCOUNTER FOR OTHER PREPROCEDURAL EXAMINATION: ICD-10-CM

## 2024-11-05 DIAGNOSIS — J32.9 CHRONIC SINUSITIS, UNSPECIFIED: ICD-10-CM

## 2024-11-05 LAB
HCT VFR BLD CALC: 34.6 % — SIGNIFICANT CHANGE UP (ref 34.5–45)
HGB BLD-MCNC: 11.3 G/DL — LOW (ref 11.5–15.5)
MCHC RBC-ENTMCNC: 29.8 PG — SIGNIFICANT CHANGE UP (ref 27–34)
MCHC RBC-ENTMCNC: 32.7 G/DL — SIGNIFICANT CHANGE UP (ref 32–36)
MCV RBC AUTO: 91.3 FL — SIGNIFICANT CHANGE UP (ref 80–100)
NRBC # BLD: 0 /100 WBCS — SIGNIFICANT CHANGE UP (ref 0–0)
PLATELET # BLD AUTO: 209 K/UL — SIGNIFICANT CHANGE UP (ref 150–400)
RBC # BLD: 3.79 M/UL — LOW (ref 3.8–5.2)
RBC # FLD: 12.8 % — SIGNIFICANT CHANGE UP (ref 10.3–14.5)
WBC # BLD: 6.23 K/UL — SIGNIFICANT CHANGE UP (ref 3.8–10.5)
WBC # FLD AUTO: 6.23 K/UL — SIGNIFICANT CHANGE UP (ref 3.8–10.5)

## 2024-11-05 PROCEDURE — 85027 COMPLETE CBC AUTOMATED: CPT

## 2024-11-05 PROCEDURE — G0463: CPT

## 2024-11-05 RX ORDER — 0.9 % SODIUM CHLORIDE 0.9 %
1000 INTRAVENOUS SOLUTION INTRAVENOUS
Refills: 0 | Status: DISCONTINUED | OUTPATIENT
Start: 2024-11-26 | End: 2024-12-10

## 2024-11-05 RX ORDER — SODIUM CHLORIDE 9 MG/ML
3 INJECTION, SOLUTION INTRAMUSCULAR; INTRAVENOUS; SUBCUTANEOUS EVERY 8 HOURS
Refills: 0 | Status: DISCONTINUED | OUTPATIENT
Start: 2024-11-26 | End: 2024-12-10

## 2024-11-05 NOTE — H&P PST ADULT - HISTORY OF PRESENT ILLNESS
35 y/o F with PMHx significant for Deviated Nasal Septum, Chronic Sinusitis, reports having gradually worsening sinus congestion over the past few months to 1 years. She endorses difficulty breathing through her nares as well as has been developing swelling/"puffiness" of bilateral eyelids. She is scheduled for Full Functional Endoscopic Sinus Surgery, Turbinate Reduction with Dr. Mike on 11/26/2024.     *Patient is also concurrently having oculoplasty lacrimal gland biopsy performed by Dr. Moy during procedure.

## 2024-11-05 NOTE — H&P PST ADULT - PROBLEM SELECTOR PLAN 1
Scheduled for Full Functional Endoscopic Sinus Surgery   Pre-procedure labs collected. PST instructions provided. Patient verbalized understanding of instructions.

## 2024-11-05 NOTE — H&P PST ADULT - NSANTHOSAYNRD_GEN_A_CORE
No. DIEUDONNE screening performed.  STOP BANG Legend: 0-2 = LOW Risk; 3-4 = INTERMEDIATE Risk; 5-8 = HIGH Risk

## 2024-11-05 NOTE — H&P PST ADULT - NSICDXPASTMEDICALHX_GEN_ALL_CORE_FT
PAST MEDICAL HISTORY:  Chronic sinusitis     DNS (deviated nasal septum)     Other disorder of lacrimal gland

## 2024-11-26 ENCOUNTER — APPOINTMENT (OUTPATIENT)
Dept: OTOLARYNGOLOGY | Facility: HOSPITAL | Age: 34
End: 2024-11-26
Payer: COMMERCIAL

## 2024-11-26 ENCOUNTER — TRANSCRIPTION ENCOUNTER (OUTPATIENT)
Age: 34
End: 2024-11-26

## 2024-11-26 ENCOUNTER — OUTPATIENT (OUTPATIENT)
Dept: OUTPATIENT SERVICES | Facility: HOSPITAL | Age: 34
LOS: 1 days | End: 2024-11-26
Payer: COMMERCIAL

## 2024-11-26 ENCOUNTER — APPOINTMENT (OUTPATIENT)
Dept: OPHTHALMOLOGY | Facility: HOSPITAL | Age: 34
End: 2024-11-26

## 2024-11-26 VITALS
HEIGHT: 65 IN | HEART RATE: 76 BPM | SYSTOLIC BLOOD PRESSURE: 111 MMHG | WEIGHT: 139.55 LBS | DIASTOLIC BLOOD PRESSURE: 69 MMHG | OXYGEN SATURATION: 99 % | RESPIRATION RATE: 16 BRPM | TEMPERATURE: 98 F

## 2024-11-26 VITALS
OXYGEN SATURATION: 96 % | SYSTOLIC BLOOD PRESSURE: 110 MMHG | HEART RATE: 78 BPM | RESPIRATION RATE: 16 BRPM | DIASTOLIC BLOOD PRESSURE: 73 MMHG

## 2024-11-26 DIAGNOSIS — J32.9 CHRONIC SINUSITIS, UNSPECIFIED: ICD-10-CM

## 2024-11-26 DIAGNOSIS — Z98.890 OTHER SPECIFIED POSTPROCEDURAL STATES: ICD-10-CM

## 2024-11-26 PROCEDURE — 88305 TISSUE EXAM BY PATHOLOGIST: CPT

## 2024-11-26 PROCEDURE — 31259 NSL/SINS NDSC SPHN TISS RMVL: CPT | Mod: 50

## 2024-11-26 PROCEDURE — 87205 SMEAR GRAM STAIN: CPT

## 2024-11-26 PROCEDURE — 88291 CYTO/MOLECULAR REPORT: CPT

## 2024-11-26 PROCEDURE — 30140 RESECT INFERIOR TURBINATE: CPT | Mod: 50

## 2024-11-26 PROCEDURE — 88184 FLOWCYTOMETRY/ TC 1 MARKER: CPT

## 2024-11-26 PROCEDURE — 31267 ENDOSCOPY MAXILLARY SINUS: CPT | Mod: 50

## 2024-11-26 PROCEDURE — 30520 REPAIR OF NASAL SEPTUM: CPT

## 2024-11-26 PROCEDURE — 88341 IMHCHEM/IMCYTCHM EA ADD ANTB: CPT | Mod: 26,59

## 2024-11-26 PROCEDURE — 88342 IMHCHEM/IMCYTCHM 1ST ANTB: CPT

## 2024-11-26 PROCEDURE — 88365 INSITU HYBRIDIZATION (FISH): CPT | Mod: 26,59

## 2024-11-26 PROCEDURE — 88364 INSITU HYBRIDIZATION (FISH): CPT

## 2024-11-26 PROCEDURE — 88364 INSITU HYBRIDIZATION (FISH): CPT | Mod: 26

## 2024-11-26 PROCEDURE — 88342 IMHCHEM/IMCYTCHM 1ST ANTB: CPT | Mod: 26,59

## 2024-11-26 PROCEDURE — 31276 NSL/SINS NDSC FRNT TISS RMVL: CPT | Mod: LT

## 2024-11-26 PROCEDURE — C9399: CPT

## 2024-11-26 PROCEDURE — 88365 INSITU HYBRIDIZATION (FISH): CPT

## 2024-11-26 PROCEDURE — 88311 DECALCIFY TISSUE: CPT

## 2024-11-26 PROCEDURE — 88304 TISSUE EXAM BY PATHOLOGIST: CPT

## 2024-11-26 PROCEDURE — 31276 NSL/SINS NDSC FRNT TISS RMVL: CPT | Mod: 50

## 2024-11-26 PROCEDURE — 68510 BIOPSY OF TEAR GLAND: CPT | Mod: 50

## 2024-11-26 PROCEDURE — C1889: CPT

## 2024-11-26 PROCEDURE — 61782 SCAN PROC CRANIAL EXTRA: CPT

## 2024-11-26 PROCEDURE — 88311 DECALCIFY TISSUE: CPT | Mod: 26

## 2024-11-26 PROCEDURE — 88304 TISSUE EXAM BY PATHOLOGIST: CPT | Mod: 26

## 2024-11-26 PROCEDURE — 88341 IMHCHEM/IMCYTCHM EA ADD ANTB: CPT

## 2024-11-26 PROCEDURE — 88305 TISSUE EXAM BY PATHOLOGIST: CPT | Mod: 26

## 2024-11-26 PROCEDURE — C2625: CPT

## 2024-11-26 PROCEDURE — 88360 TUMOR IMMUNOHISTOCHEM/MANUAL: CPT | Mod: 26

## 2024-11-26 PROCEDURE — 88264 CHROMOSOME ANALYSIS 20-25: CPT

## 2024-11-26 PROCEDURE — 88360 TUMOR IMMUNOHISTOCHEM/MANUAL: CPT

## 2024-11-26 PROCEDURE — 88189 FLOWCYTOMETRY/READ 16 & >: CPT | Mod: 59

## 2024-11-26 PROCEDURE — 88189 FLOWCYTOMETRY/READ 16 & >: CPT

## 2024-11-26 PROCEDURE — 88237 TISSUE CULTURE BONE MARROW: CPT

## 2024-11-26 PROCEDURE — 88280 CHROMOSOME KARYOTYPE STUDY: CPT

## 2024-11-26 PROCEDURE — 88185 FLOWCYTOMETRY/TC ADD-ON: CPT

## 2024-11-26 DEVICE — STENT SINUS DRUG ELUDING PROPEL CONTOUR
Type: IMPLANTABLE DEVICE | Site: BILATERAL | Status: NON-FUNCTIONAL
Removed: 2024-11-26

## 2024-11-26 DEVICE — SURGICEL 2 X 14"
Type: IMPLANTABLE DEVICE | Site: BILATERAL | Status: NON-FUNCTIONAL
Removed: 2024-11-26

## 2024-11-26 DEVICE — STAPLER SEPTAL ENTACT
Type: IMPLANTABLE DEVICE | Site: BILATERAL | Status: NON-FUNCTIONAL
Removed: 2024-11-26

## 2024-11-26 RX ORDER — CEPHALEXIN 500 MG
1 CAPSULE ORAL
Qty: 14 | Refills: 0
Start: 2024-11-26 | End: 2024-12-02

## 2024-11-26 RX ORDER — B-COMPLEX WITH VITAMIN C
1 VIAL (ML) INJECTION
Refills: 0 | DISCHARGE

## 2024-11-26 RX ORDER — KETOROLAC TROMETHAMINE 30 MG/ML
30 INJECTION INTRAMUSCULAR; INTRAVENOUS ONCE
Refills: 0 | Status: DISCONTINUED | OUTPATIENT
Start: 2024-11-26 | End: 2024-11-26

## 2024-11-26 RX ORDER — FENTANYL 12 UG/H
25 PATCH, EXTENDED RELEASE TRANSDERMAL
Refills: 0 | Status: DISCONTINUED | OUTPATIENT
Start: 2024-11-26 | End: 2024-11-26

## 2024-11-26 RX ORDER — LIDOCAINE HCL 20 MG/ML
0.2 VIAL (ML) INJECTION ONCE
Refills: 0 | Status: COMPLETED | OUTPATIENT
Start: 2024-11-26 | End: 2024-11-26

## 2024-11-26 RX ORDER — OXYCODONE HYDROCHLORIDE 30 MG/1
1 TABLET ORAL
Qty: 5 | Refills: 0
Start: 2024-11-26

## 2024-11-26 RX ORDER — ONDANSETRON HYDROCHLORIDE 4 MG/1
4 TABLET, FILM COATED ORAL ONCE
Refills: 0 | Status: DISCONTINUED | OUTPATIENT
Start: 2024-11-26 | End: 2024-12-10

## 2024-11-26 RX ADMIN — FENTANYL 25 MICROGRAM(S): 12 PATCH, EXTENDED RELEASE TRANSDERMAL at 12:54

## 2024-11-26 RX ADMIN — FENTANYL 25 MICROGRAM(S): 12 PATCH, EXTENDED RELEASE TRANSDERMAL at 12:40

## 2024-11-26 RX ADMIN — SODIUM CHLORIDE 3 MILLILITER(S): 9 INJECTION, SOLUTION INTRAMUSCULAR; INTRAVENOUS; SUBCUTANEOUS at 07:44

## 2024-11-26 RX ADMIN — Medication 100 MILLILITER(S): at 07:37

## 2024-11-26 RX ADMIN — KETOROLAC TROMETHAMINE 30 MILLIGRAM(S): 30 INJECTION INTRAMUSCULAR; INTRAVENOUS at 12:52

## 2024-11-26 NOTE — BRIEF OPERATIVE NOTE - NSICDXBRIEFPROCEDURE_GEN_ALL_CORE_FT
PROCEDURES:  FESS, adult 26-Nov-2024 12:02:45  Kaia Koenig  Septoplasty, turbinate reduction 26-Nov-2024 12:03:33  Kaia Koenig  
PROCEDURES:  Lacrimal gland biopsy 26-Nov-2024 10:05:03 bilateral, with repositiong Jayda Lerma P

## 2024-11-26 NOTE — BRIEF OPERATIVE NOTE - NSICDXBRIEFPREOP_GEN_ALL_CORE_FT
PRE-OP DIAGNOSIS:  Lacrimal gland inflammation, bilateral 26-Nov-2024 10:04:36  Jayda Lerma P  
PRE-OP DIAGNOSIS:  Chronic nasal congestion 26-Nov-2024 12:03:46  Kaia Koenig  Chronic recurrent sinusitis 26-Nov-2024 12:03:55  Kaia Koenig

## 2024-11-26 NOTE — ASU DISCHARGE PLAN (ADULT/PEDIATRIC) - PROVIDER TOKENS
FREE:[LAST:[Florentin],FIRST:[Jayda],PHONE:[phone]],FAX:[phone]],ADDRESS:[Dr. Moy Physician Assistant  [address] [place] [address]],SCHEDULEDAPPT:[12/06/2024],SCHEDULEDAPPTTIME:[09:30 AM]] FREE:[LAST:[Florentin],FIRST:[Jayda],PHONE:[phone]],FAX:[phone]],ADDRESS:[Dr. Moy Physician Assistant  [address] [place] [address]],SCHEDULEDAPPT:[12/06/2024],SCHEDULEDAPPTTIME:[09:30 AM]],PROVIDER:[TOKEN:[[id number]],SCHEDULEDAPPT:[12/11/2024],SCHEDULEDAPPTTIME:[01:30 PM]]

## 2024-11-26 NOTE — ASU DISCHARGE PLAN (ADULT/PEDIATRIC) - CARE PROVIDER_API CALL
Jayda Lerma Dr. Physician Assistant  [address] [place] [address]  Phone: [phone]  Fax: [phone]  Scheduled Appointment: 12/06/2024 09:30 AM   Jayda Lerma Dr. Physician Assistant  [address] [place] [address]  Phone: [phone]  Fax: [phone]  Scheduled Appointment: 12/06/2024 09:30 AM    Vika Mike  Otolaryngology  [address] [place] [address], [facility] [address]  [place] [address]  Phone: [phone]  Fax: [phone]  Scheduled Appointment: 12/11/2024 01:30 PM

## 2024-11-26 NOTE — BRIEF OPERATIVE NOTE - NSICDXBRIEFPOSTOP_GEN_ALL_CORE_FT
POST-OP DIAGNOSIS:  Lacrimal gland inflammation, bilateral 26-Nov-2024 10:04:52  Jayda Lerma P  
POST-OP DIAGNOSIS:  Chronic nasal congestion 26-Nov-2024 12:04:11  Kaia Koenig  Chronic recurrent sinusitis 26-Nov-2024 12:04:19  Kaia Koenig

## 2024-11-26 NOTE — BRIEF OPERATIVE NOTE - SPECIMENS
2 right lacrimal gland (one fresh, one formalin), 2 left lacrimal gland (one fresh, one formalin)
sinus contents

## 2024-11-26 NOTE — ASU DISCHARGE PLAN (ADULT/PEDIATRIC) - ASU DC SPECIAL INSTRUCTIONSFT
Postop Instructions for Eyelid Surgery – Dr. Moy     After surgery instructions     For the remainder of the surgery day and the day after surgery, apply ice to the eyes for at least 20 minutes out of every hour to reduce bruising. Any kind of cold pack or cold compress is fine (for example: ice cubes in a baggie, towel dipped in ice water, frozen gel pack). The more ice the better—you can’t overdo it!     Apply the prescribed ointment to your stitches 4 times a day (breakfast, lunch, dinner, and before bed). Continue using these medications until your follow up appointment.     Take all your regular medications and eye drops as usual.     It may help minimize swelling to sleep with your head slightly elevated on a pillow     You may shower the day after surgery. It is okay to get your stitches wet and soapy. Do not rub the stitches. Simply let soap and water run over the area and pat it gently dry. If there is any crustiness caught in the stitches you can remove it gently with a damp Q-tip.     On the second day after surgery, you can stop using ice and start using warm compresses if desired to reduce swelling     You may take a gentle walk, climb stairs, and do light household chores as normal     Avoid exercise, lifting heavy weights (>10 pounds), straining to defecate, or other strenuous activities that raise your heart rate or blood pressure for 14 days after surgery.      Avoid doing any dirty, magdaleno activities or chores for 7 days after surgery.     Do not submerge your stitches in any lake, ocean, pool, or hot tub (or any other shared water) for 14 days after surgery.     Do not use any eye makeup or mascara until you have your postop appointment.     Keep your incisions out of the sun for at least 6 months.           What is normal after surgery:     It is normal to have a little bit of blood oozing around the stitches. You can simply wipe it away with a clean tissue or towel.     It is normal for your vision to be slightly blurry. This is usually due to ointment getting in the eye.     It is normal to have pink or bloody tears     It is normal to have significant bruising and swelling around the eyes, even to the point of having two black eyes. It is normal for the bruising to move downwards to your lower eyelids and cheeks with gravity, even if you did not have surgery on the lower eyelids. The bruising and the majority of swelling usually resolves in about 2 weeks. However, residual swelling can linger for weeks to months.     Your eyelids or eyelashes may feel numb for several weeks after surgery.     It is normal for the eyes to feel especially dry, scratchy, teary, or itchy in the first few weeks after surgery. You may use artificial tear drops (over the counter) as needed to soothe the eyes.       When to call the doctor:     There is a sudden increase in bruising and swelling or the eyelid is so swollen and hard that you cannot pry the eye open with your hands—THIS IS AN EMERGENCY     If there is severe vision loss and all you can see is shapes or black—THIS IS AN EMERGENCY     There is bleeding from the stitches that does not stop after holding firm pressure with a clean towel for 10 minutes without peeking     You have severe pain that is not relieved by ice and 2 acetaminophen tablets     You have severe pain in the eye and it feels like a stitch is constantly poking you in the eye.          Office phone: [place] [phone]     [place]: [phone]     Our office phones are answered 24 hours a day. After business hours, please identify yourself as a patient who just had surgery and ask to speak to the doctor on call.      Dr. Moy’s work cell phone: [phone] Emergencies only, please Postop Instructions for Eyelid Surgery – Dr. Moy     After surgery instructions     For the remainder of the surgery day and the day after surgery, apply ice to the eyes for at least 20 minutes out of every hour to reduce bruising. Any kind of cold pack or cold compress is fine (for example: ice cubes in a baggie, towel dipped in ice water, frozen gel pack). The more ice the better—you can’t overdo it!     Apply the prescribed ointment to your stitches 4 times a day (breakfast, lunch, dinner, and before bed). Continue using these medications until your follow up appointment.     Take all your regular medications and eye drops as usual.     It may help minimize swelling to sleep with your head slightly elevated on a pillow     You may shower the day after surgery. It is okay to get your stitches wet and soapy. Do not rub the stitches. Simply let soap and water run over the area and pat it gently dry. If there is any crustiness caught in the stitches you can remove it gently with a damp Q-tip.     On the second day after surgery, you can stop using ice and start using warm compresses if desired to reduce swelling     You may take a gentle walk, climb stairs, and do light household chores as normal     Avoid exercise, lifting heavy weights (>10 pounds), straining to defecate, or other strenuous activities that raise your heart rate or blood pressure for 14 days after surgery.      Avoid doing any dirty, magdaleno activities or chores for 7 days after surgery.     Do not submerge your stitches in any lake, ocean, pool, or hot tub (or any other shared water) for 14 days after surgery.     Do not use any eye makeup or mascara until you have your postop appointment.     Keep your incisions out of the sun for at least 6 months.           What is normal after surgery:     It is normal to have a little bit of blood oozing around the stitches. You can simply wipe it away with a clean tissue or towel.     It is normal for your vision to be slightly blurry. This is usually due to ointment getting in the eye.     It is normal to have pink or bloody tears     It is normal to have significant bruising and swelling around the eyes, even to the point of having two black eyes. It is normal for the bruising to move downwards to your lower eyelids and cheeks with gravity, even if you did not have surgery on the lower eyelids. The bruising and the majority of swelling usually resolves in about 2 weeks. However, residual swelling can linger for weeks to months.     Your eyelids or eyelashes may feel numb for several weeks after surgery.     It is normal for the eyes to feel especially dry, scratchy, teary, or itchy in the first few weeks after surgery. You may use artificial tear drops (over the counter) as needed to soothe the eyes.       When to call the doctor:     There is a sudden increase in bruising and swelling or the eyelid is so swollen and hard that you cannot pry the eye open with your hands—THIS IS AN EMERGENCY     If there is severe vision loss and all you can see is shapes or black—THIS IS AN EMERGENCY     There is bleeding from the stitches that does not stop after holding firm pressure with a clean towel for 10 minutes without peeking     You have severe pain that is not relieved by ice and 2 acetaminophen tablets     You have severe pain in the eye and it feels like a stitch is constantly poking you in the eye.          Office phone: [place] [phone]     [place]: [phone]     Our office phones are answered 24 hours a day. After business hours, please identify yourself as a patient who just had surgery and ask to speak to the doctor on call.      Dr. Moy’s work cell phone: [phone] Emergencies only, please    Postop Sinus Surgery- Dr. Mike    Activity: No heavy exercise or heavy lifting for 2 weeks. No driving or making important decisions for 24 hours. No driving while on prescription pain medication.     Diet: Start with clear liquids then advance to regular diet. Drink plenty of fluids.     Prophylactic antibiotics was sent to your pharmacy that you can start tonight. Keflex 500mg one tab twice a day for 1 week    Pain management: Prescriptions have been sent to your pharmacy (you may have already picked them up). Take tylenol TWO 500mg tablets (1 gram) every 8 hours for pain. May also alternate with motrin THREE 200mg tablets (600mg) every 6 hours. For severe pain that is still not controlled may take the prescription pain med as directed on the bottle, however it may cause nausea and constipation.    Bleeding: Expect bloody drainage from the nose for the first 1-2 days. Change the janine dressing under the nose as needed (may even need to change up to every hour). If constant steady drip of bright red blood that is saturating more than one gauze an hour, call Dr. Mike office at [phone]. If bleeding heavily or feel lightheaded then immediately call 911 or go to nearest Emergency Department. If close, prefer you go to [facility] at [address].     Nasal care: start using Neilmed sinus rinse twice a day. This is a nasal wash that will wash out blood and any dissolvable packing that may have been placed. Look up a youtube video for "neilmed sinus rinse" if you are unsure how to use it. You can buy this at any pharmacy (it is a squeeze bottle with a bunch of packets). Start this tomorrow or the day after and use it AT LEAST every morning and night. You can do it even more often if needed to help you breathe better. No nose blowing for 2 weeks. Ok to sniff in and spit out.     Fever: Low grade fever is common after surgery. If it does not respond to cool compresses and tylenol/motrin then call Dr. Mike office at [phone].    Follow Up: If you do not already have a follow up appt then call Dr. Mike office at [phone] to make an appointment for 1 week.

## 2024-11-26 NOTE — OPERATIVE REPORT - OPERATIVE RPOSRT DETAILS
Date of Surgery:  11/26/24    Surgeon: Jacqui Moy MD    Assistant: KAI Pratt    Preoperative Diagnosis: 1. Chronic enlargement of bilateral lacrimal glands   2. Bilateral chronic orbital inflammation    Postoperative Diagnosis: 1. Chronic enlargement of bilateral lacrimal glands   2. Bilateral chronic orbital inflammation    Surgery Performed: Bilateral lacrimal gland biopsy and repositioning [id number]    Anesthesia: General    Complications: none    Specimens: 1. Right lacrimal gland (fresh and in formalin) 2. Left lacrimal gland (fresh and in formalin)    Indications for procedure: The patient bilateral enlarged lacrimal glands. I recommended orbitotomy with lacrimal gland biopsy and lacrimal gland repositioning The risks, benefits, and alternatives of the procedure were discussed with the patient including, but not  limited to, pain, bruising, swelling, eye irritation, tearing, dry eye, bleeding, infection, inflammation, scarring, temporary or permanent ptosis, temporary or permanent double vision, temporary or permanent dry eye, need for revision or additional surgery or medical treatment, and in rare cases damage to eye, loss of vision, or blindness. The patient elected to       Description of procedure:   The patient was identified in the pre-operative holding area. The operative eye(s) were marked. Written informed consent was obtained. The patient was brought to the operating room and positioned supine. General anesthesia was established by the hospital anesthesia team.     Upper eyelid crease incisions were marked.     Tetracaine was instilled in the eyes. Local anesthetic consisting was injected in the upper lids. The face was prepped and draped in the usual sterile fashion for ophthalmic plastic surgery. A time out was performed confirming the correct patient, procedure, and laterality.     Attention was first turned to the right upper lid. A #15 blade was  used to make the previously marked incision. Dissection was then carried out through the orbicularis oculi muscle and orbital septum using high temperature cautery. The lacrimal gland was exposed and  from the pre-aponeurotic fat.  Two biopsies were then taken of the lacrimal gland. One was sent for permanent section and one was sent fresh for flow cytometry.  Bleeding was controlled with bipolar cautery.    The lacrimal gland was then engaged with a double-armed 4-0 Prolene suture. It was repositioned into the lacrimal gland fossa and secured to the periosteum.    The upper lid incision was closed with running 6-0 Prolene.    The same procedure was then performed on the contralateral side.    The face was washed and ophthalmic antibiotic ointment was applied to the incisions.     The patient tolerated the procedure well without complication. They were awakened from sedation and taken to PACU in stable condition.

## 2024-11-26 NOTE — ASU DISCHARGE PLAN (ADULT/PEDIATRIC) - NS MD DC FALL RISK RISK
For information on Fall & Injury Prevention, visit: [email or web address] OR  [email or web address] OR  https://www.cdc.gov/steadi/patient.html

## 2024-11-26 NOTE — PRE-ANESTHESIA EVALUATION ADULT - NSANTHDISPORD_GEN_ALL_CORE
----- Message from Josey Ramsey sent at 4/20/2023 11:41 AM EDT -----  Subject: Message to Provider    QUESTIONS  Information for Provider? patient calling in to see if she can get a rx   for an atb and cough medication, pharmacy is [facility], please   call with any questions for pt  ---------------------------------------------------------------------------  --------------  Branda Brittle INFO  [phone]; OK to leave message on voicemail  ---------------------------------------------------------------------------  --------------  SCRIPT ANSWERS  Relationship to Patient?  Self PACU

## 2024-11-26 NOTE — ASU DISCHARGE PLAN (ADULT/PEDIATRIC) - FINANCIAL ASSISTANCE
[facility] provides services at a reduced cost to those who are determined to be eligible through [facility]’s financial assistance program. Information regarding [facility]’s financial assistance program can be found by going to [email or web address] or by calling [phone].

## 2024-11-26 NOTE — ASU DISCHARGE PLAN (ADULT/PEDIATRIC) - NURSING INSTRUCTIONS
OK to take Tylenol/Acetaminophen 1000 mg  at  4:30 PM for pain and every 6 hours after as needed. OK to take Motrin/Ibuprofen 600 mg  at 7pm for pain and every 6 hours after as needed.

## 2024-12-06 ENCOUNTER — APPOINTMENT (OUTPATIENT)
Dept: OPHTHALMOLOGY | Facility: CLINIC | Age: 34
End: 2024-12-06
Payer: COMMERCIAL

## 2024-12-06 ENCOUNTER — NON-APPOINTMENT (OUTPATIENT)
Age: 34
End: 2024-12-06

## 2024-12-06 PROBLEM — J32.9 CHRONIC SINUSITIS, UNSPECIFIED: Chronic | Status: ACTIVE | Noted: 2024-11-05

## 2024-12-06 PROBLEM — H04.19: Chronic | Status: ACTIVE | Noted: 2024-11-05

## 2024-12-06 PROCEDURE — 99024 POSTOP FOLLOW-UP VISIT: CPT

## 2024-12-06 PROCEDURE — 92285 EXTERNAL OCULAR PHOTOGRAPHY: CPT

## 2024-12-11 ENCOUNTER — NON-APPOINTMENT (OUTPATIENT)
Age: 34
End: 2024-12-11

## 2024-12-11 ENCOUNTER — APPOINTMENT (OUTPATIENT)
Dept: OTOLARYNGOLOGY | Facility: CLINIC | Age: 34
End: 2024-12-11
Payer: COMMERCIAL

## 2024-12-11 VITALS — HEART RATE: 81 BPM | SYSTOLIC BLOOD PRESSURE: 118 MMHG | TEMPERATURE: 98 F | DIASTOLIC BLOOD PRESSURE: 69 MMHG

## 2024-12-11 VITALS — BODY MASS INDEX: 21.99 KG/M2 | HEIGHT: 65 IN | WEIGHT: 132 LBS

## 2024-12-11 DIAGNOSIS — J32.9 CHRONIC SINUSITIS, UNSPECIFIED: ICD-10-CM

## 2024-12-11 DIAGNOSIS — J34.89 OTHER SPECIFIED DISORDERS OF NOSE AND NASAL SINUSES: ICD-10-CM

## 2024-12-11 DIAGNOSIS — R09.81 NASAL CONGESTION: ICD-10-CM

## 2024-12-11 DIAGNOSIS — Z98.890 OTHER SPECIFIED POSTPROCEDURAL STATES: ICD-10-CM

## 2024-12-11 PROCEDURE — 31237 NSL/SINS NDSC SURG BX POLYPC: CPT | Mod: 50,58

## 2024-12-11 PROCEDURE — 99024 POSTOP FOLLOW-UP VISIT: CPT

## 2024-12-12 ENCOUNTER — APPOINTMENT (OUTPATIENT)
Dept: RHEUMATOLOGY | Facility: CLINIC | Age: 34
End: 2024-12-12
Payer: COMMERCIAL

## 2024-12-12 VITALS
HEIGHT: 65 IN | RESPIRATION RATE: 16 BRPM | BODY MASS INDEX: 22.33 KG/M2 | SYSTOLIC BLOOD PRESSURE: 113 MMHG | WEIGHT: 134 LBS | DIASTOLIC BLOOD PRESSURE: 70 MMHG | HEART RATE: 85 BPM | OXYGEN SATURATION: 98 %

## 2024-12-12 DIAGNOSIS — H05.10 UNSPECIFIED CHRONIC INFLAMMATORY DISORDERS OF ORBIT: ICD-10-CM

## 2024-12-12 DIAGNOSIS — D89.84 IGG4-RELATED DISEASE: ICD-10-CM

## 2024-12-12 DIAGNOSIS — Z79.899 OTHER LONG TERM (CURRENT) DRUG THERAPY: ICD-10-CM

## 2024-12-12 DIAGNOSIS — J32.9 CHRONIC SINUSITIS, UNSPECIFIED: ICD-10-CM

## 2024-12-12 PROCEDURE — 99204 OFFICE O/P NEW MOD 45 MIN: CPT

## 2024-12-14 LAB
ALBUMIN SERPL ELPH-MCNC: 4.6 G/DL
ALP BLD-CCNC: 56 U/L
ALT SERPL-CCNC: 8 U/L
ANION GAP SERPL CALC-SCNC: 13 MMOL/L
APPEARANCE: CLEAR
AST SERPL-CCNC: 12 U/L
BACTERIA: NEGATIVE /HPF
BASOPHILS # BLD AUTO: 0.08 K/UL
BASOPHILS NFR BLD AUTO: 0.9 %
BILIRUB SERPL-MCNC: 0.3 MG/DL
BILIRUBIN URINE: NEGATIVE
BLOOD URINE: NEGATIVE
BUN SERPL-MCNC: 13 MG/DL
CALCIUM SERPL-MCNC: 9.9 MG/DL
CAST: 0 /LPF
CD16+CD56+ CELLS # BLD: 65 CELLS/UL
CD16+CD56+ CELLS NFR BLD: 4 %
CD19 CELLS NFR BLD: 146 CELLS/UL
CD3 CELLS # BLD: 1235 CELLS/UL
CD3 CELLS NFR BLD: 83 %
CD3+CD4+ CELLS # BLD: 775 CELLS/UL
CD3+CD4+ CELLS NFR BLD: 52 %
CD3+CD4+ CELLS/CD3+CD8+ CLL SPEC: 3.03 RATIO
CD3+CD8+ CELLS # SPEC: 256 CELLS/UL
CD3+CD8+ CELLS NFR BLD: 17 %
CELLS.CD3-CD19+/CELLS IN BLOOD: 10 %
CHLORIDE SERPL-SCNC: 100 MMOL/L
CO2 SERPL-SCNC: 22 MMOL/L
COLOR: YELLOW
CREAT SERPL-MCNC: 0.59 MG/DL
CREAT SPEC-SCNC: 13 MG/DL
CREAT/PROT UR: NORMAL RATIO
DEPRECATED KAPPA LC FREE/LAMBDA SER: 1.01 RATIO
EGFR: 122 ML/MIN/1.73M2
EOSINOPHIL # BLD AUTO: 0.26 K/UL
EOSINOPHIL NFR BLD AUTO: 3 %
EPITHELIAL CELLS: 2 /HPF
GLUCOSE QUALITATIVE U: NEGATIVE MG/DL
GLUCOSE SERPL-MCNC: 115 MG/DL
HBV CORE IGG+IGM SER QL: NONREACTIVE
HBV CORE IGM SER QL: NONREACTIVE
HBV SURFACE AB SER QL: REACTIVE
HBV SURFACE AG SER QL: NONREACTIVE
HCT VFR BLD CALC: 39.3 %
HCV AB SER QL: NONREACTIVE
HCV S/CO RATIO: 0.12 S/CO
HGB BLD-MCNC: 12.5 G/DL
IGA SER QL IEP: 421 MG/DL
IGE SER-MCNC: 1158 KU/L
IGG SER QL IEP: 1036 MG/DL
IGG SER QL IEP: 1036 MG/DL
IGG1 SER-MCNC: 444 MG/DL
IGG2 SER-MCNC: 333 MG/DL
IGG3 SER-MCNC: 64.1 MG/DL
IGG4 SER-MCNC: 239.7 MG/DL
IGM SER QL IEP: 114 MG/DL
IMM GRANULOCYTES NFR BLD AUTO: 0.2 %
KAPPA LC CSF-MCNC: 1.63 MG/DL
KAPPA LC SERPL-MCNC: 1.65 MG/DL
KETONES URINE: NEGATIVE MG/DL
LEUKOCYTE ESTERASE URINE: NEGATIVE
LYMPHOCYTES # BLD AUTO: 1.76 K/UL
LYMPHOCYTES NFR BLD AUTO: 20.3 %
MAN DIFF?: NORMAL
MCHC RBC-ENTMCNC: 29.8 PG
MCHC RBC-ENTMCNC: 31.8 G/DL
MCV RBC AUTO: 93.6 FL
MICROSCOPIC-UA: NORMAL
MONOCYTES # BLD AUTO: 0.46 K/UL
MONOCYTES NFR BLD AUTO: 5.3 %
NEUTROPHILS # BLD AUTO: 6.11 K/UL
NEUTROPHILS NFR BLD AUTO: 70.3 %
NITRITE URINE: NEGATIVE
PH URINE: 7
PLATELET # BLD AUTO: 241 K/UL
POTASSIUM SERPL-SCNC: 4.1 MMOL/L
PROT SERPL-MCNC: 7.5 G/DL
PROT UR-MCNC: <4 MG/DL
PROTEIN URINE: NEGATIVE MG/DL
RBC # BLD: 4.2 M/UL
RBC # FLD: 12.6 %
RED BLOOD CELLS URINE: 1 /HPF
SODIUM SERPL-SCNC: 136 MMOL/L
SPECIFIC GRAVITY URINE: 1
UROBILINOGEN URINE: 0.2 MG/DL
WBC # FLD AUTO: 8.69 K/UL
WHITE BLOOD CELLS URINE: 0 /HPF

## 2024-12-15 ENCOUNTER — OUTPATIENT (OUTPATIENT)
Dept: OUTPATIENT SERVICES | Facility: HOSPITAL | Age: 34
LOS: 1 days | End: 2024-12-15
Payer: COMMERCIAL

## 2024-12-15 DIAGNOSIS — Z79.899 OTHER LONG TERM (CURRENT) DRUG THERAPY: ICD-10-CM

## 2024-12-15 DIAGNOSIS — Z00.8 ENCOUNTER FOR OTHER GENERAL EXAMINATION: ICD-10-CM

## 2024-12-15 PROCEDURE — 78816 PET IMAGE W/CT FULL BODY: CPT

## 2024-12-15 PROCEDURE — A9552: CPT

## 2024-12-17 ENCOUNTER — NON-APPOINTMENT (OUTPATIENT)
Age: 34
End: 2024-12-17

## 2024-12-17 LAB
M TB IFN-G BLD-IMP: NEGATIVE
QUANTIFERON TB PLUS MITOGEN MINUS NIL: >10 IU/ML
QUANTIFERON TB PLUS NIL: 0.03 IU/ML
QUANTIFERON TB PLUS TB1 MINUS NIL: 0 IU/ML
QUANTIFERON TB PLUS TB2 MINUS NIL: 0 IU/ML

## 2024-12-19 DIAGNOSIS — N83.202 UNSPECIFIED OVARIAN CYST, LEFT SIDE: ICD-10-CM

## 2024-12-19 DIAGNOSIS — Z00.00 ENCOUNTER FOR GENERAL ADULT MEDICAL EXAMINATION W/OUT ABNORMAL FINDINGS: ICD-10-CM

## 2025-01-02 ENCOUNTER — APPOINTMENT (OUTPATIENT)
Dept: INTERNAL MEDICINE | Facility: CLINIC | Age: 35
End: 2025-01-02

## 2025-01-02 DIAGNOSIS — Z00.00 ENCOUNTER FOR GENERAL ADULT MEDICAL EXAMINATION W/OUT ABNORMAL FINDINGS: ICD-10-CM

## 2025-01-02 DIAGNOSIS — R09.81 NASAL CONGESTION: ICD-10-CM

## 2025-01-02 DIAGNOSIS — D89.84 IGG4-RELATED DISEASE: ICD-10-CM

## 2025-01-02 DIAGNOSIS — H05.10 UNSPECIFIED CHRONIC INFLAMMATORY DISORDERS OF ORBIT: ICD-10-CM

## 2025-01-02 DIAGNOSIS — J32.9 CHRONIC SINUSITIS, UNSPECIFIED: ICD-10-CM

## 2025-01-03 ENCOUNTER — NON-APPOINTMENT (OUTPATIENT)
Age: 35
End: 2025-01-03

## 2025-01-03 ENCOUNTER — APPOINTMENT (OUTPATIENT)
Dept: OPHTHALMOLOGY | Facility: CLINIC | Age: 35
End: 2025-01-03
Payer: COMMERCIAL

## 2025-01-03 PROCEDURE — 92285 EXTERNAL OCULAR PHOTOGRAPHY: CPT

## 2025-01-03 PROCEDURE — 99024 POSTOP FOLLOW-UP VISIT: CPT

## 2025-01-22 ENCOUNTER — APPOINTMENT (OUTPATIENT)
Dept: OTOLARYNGOLOGY | Facility: CLINIC | Age: 35
End: 2025-01-22
Payer: COMMERCIAL

## 2025-01-22 ENCOUNTER — APPOINTMENT (OUTPATIENT)
Dept: RHEUMATOLOGY | Facility: CLINIC | Age: 35
End: 2025-01-22
Payer: COMMERCIAL

## 2025-01-22 VITALS — WEIGHT: 134 LBS | BODY MASS INDEX: 22.33 KG/M2 | HEIGHT: 65 IN

## 2025-01-22 VITALS
HEART RATE: 74 BPM | BODY MASS INDEX: 22.49 KG/M2 | RESPIRATION RATE: 16 BRPM | OXYGEN SATURATION: 98 % | HEIGHT: 65 IN | DIASTOLIC BLOOD PRESSURE: 73 MMHG | WEIGHT: 135 LBS | SYSTOLIC BLOOD PRESSURE: 107 MMHG

## 2025-01-22 DIAGNOSIS — Z98.890 OTHER SPECIFIED POSTPROCEDURAL STATES: ICD-10-CM

## 2025-01-22 DIAGNOSIS — D89.84 IGG4-RELATED DISEASE: ICD-10-CM

## 2025-01-22 DIAGNOSIS — R09.81 NASAL CONGESTION: ICD-10-CM

## 2025-01-22 DIAGNOSIS — J34.89 OTHER SPECIFIED DISORDERS OF NOSE AND NASAL SINUSES: ICD-10-CM

## 2025-01-22 PROCEDURE — 31231 NASAL ENDOSCOPY DX: CPT | Mod: 58

## 2025-01-22 PROCEDURE — 99215 OFFICE O/P EST HI 40 MIN: CPT

## 2025-01-22 PROCEDURE — 99024 POSTOP FOLLOW-UP VISIT: CPT

## 2025-01-22 RX ORDER — PREDNISONE 5 MG/1
5 TABLET ORAL
Qty: 90 | Refills: 0 | Status: ACTIVE | COMMUNITY
Start: 2025-01-22 | End: 1900-01-01

## 2025-01-23 RX ORDER — RITUXIMAB 10 MG/ML
500 INJECTION, SOLUTION INTRAVENOUS
Refills: 0 | Status: ACTIVE | OUTPATIENT
Start: 2025-01-23

## 2025-01-23 RX ORDER — CETIRIZINE HYDROCHLORIDE 10 MG/1
10 TABLET, FILM COATED ORAL DAILY
Refills: 0 | Status: ACTIVE | OUTPATIENT
Start: 2025-01-23

## 2025-01-23 RX ORDER — ACETAMINOPHEN 325 MG/1
325 TABLET ORAL
Refills: 0 | Status: ACTIVE | OUTPATIENT
Start: 2025-01-23

## 2025-01-23 RX ORDER — METHYLPREDNISOLONE 125 MG/2ML
125 INJECTION, POWDER, LYOPHILIZED, FOR SOLUTION INTRAMUSCULAR; INTRAVENOUS
Refills: 0 | Status: ACTIVE | OUTPATIENT
Start: 2025-01-23

## 2025-01-23 RX ADMIN — METHYLPREDNISOLONE 0 MG: 125 INJECTION, POWDER, LYOPHILIZED, FOR SOLUTION INTRAMUSCULAR; INTRAVENOUS at 00:00

## 2025-01-23 RX ADMIN — ACETAMINOPHEN 0 MG: 325 TABLET ORAL at 00:00

## 2025-01-23 RX ADMIN — RITUXIMAB 0 MG/50ML: 10 INJECTION, SOLUTION INTRAVENOUS at 00:00

## 2025-01-23 RX ADMIN — CETIRIZINE HYDROCHLORIDE 0 MG: 10 TABLET, FILM COATED ORAL at 00:00

## 2025-01-27 ENCOUNTER — APPOINTMENT (OUTPATIENT)
Dept: RHEUMATOLOGY | Facility: CLINIC | Age: 35
End: 2025-01-27

## 2025-02-07 ENCOUNTER — APPOINTMENT (OUTPATIENT)
Dept: ULTRASOUND IMAGING | Facility: CLINIC | Age: 35
End: 2025-02-07
Payer: COMMERCIAL

## 2025-02-07 ENCOUNTER — OUTPATIENT (OUTPATIENT)
Dept: OUTPATIENT SERVICES | Facility: HOSPITAL | Age: 35
LOS: 1 days | End: 2025-02-07
Payer: COMMERCIAL

## 2025-02-07 DIAGNOSIS — Z00.8 ENCOUNTER FOR OTHER GENERAL EXAMINATION: ICD-10-CM

## 2025-02-07 DIAGNOSIS — N83.202 UNSPECIFIED OVARIAN CYST, LEFT SIDE: ICD-10-CM

## 2025-02-07 PROCEDURE — 76830 TRANSVAGINAL US NON-OB: CPT

## 2025-02-07 PROCEDURE — 76830 TRANSVAGINAL US NON-OB: CPT | Mod: 26

## 2025-02-07 PROCEDURE — 76856 US EXAM PELVIC COMPLETE: CPT | Mod: 26

## 2025-02-07 PROCEDURE — 76856 US EXAM PELVIC COMPLETE: CPT

## 2025-03-03 ENCOUNTER — APPOINTMENT (OUTPATIENT)
Dept: OBGYN | Facility: CLINIC | Age: 35
End: 2025-03-03
Payer: COMMERCIAL

## 2025-03-03 ENCOUNTER — TRANSCRIPTION ENCOUNTER (OUTPATIENT)
Age: 35
End: 2025-03-03

## 2025-03-03 VITALS
HEIGHT: 65 IN | BODY MASS INDEX: 23.49 KG/M2 | WEIGHT: 141 LBS | SYSTOLIC BLOOD PRESSURE: 120 MMHG | DIASTOLIC BLOOD PRESSURE: 64 MMHG

## 2025-03-03 DIAGNOSIS — N83.202 UNSPECIFIED OVARIAN CYST, LEFT SIDE: ICD-10-CM

## 2025-03-03 PROCEDURE — 99205 OFFICE O/P NEW HI 60 MIN: CPT

## 2025-03-04 ENCOUNTER — APPOINTMENT (OUTPATIENT)
Dept: OPHTHALMOLOGY | Facility: CLINIC | Age: 35
End: 2025-03-04

## 2025-03-27 ENCOUNTER — APPOINTMENT (OUTPATIENT)
Dept: OTOLARYNGOLOGY | Facility: CLINIC | Age: 35
End: 2025-03-27

## 2025-04-04 ENCOUNTER — APPOINTMENT (OUTPATIENT)
Dept: RHEUMATOLOGY | Facility: CLINIC | Age: 35
End: 2025-04-04
Payer: COMMERCIAL

## 2025-04-04 VITALS
HEIGHT: 65 IN | SYSTOLIC BLOOD PRESSURE: 111 MMHG | BODY MASS INDEX: 23.49 KG/M2 | WEIGHT: 141 LBS | OXYGEN SATURATION: 97 % | DIASTOLIC BLOOD PRESSURE: 70 MMHG | HEART RATE: 72 BPM

## 2025-04-04 DIAGNOSIS — D89.84 IGG4-RELATED DISEASE: ICD-10-CM

## 2025-04-04 DIAGNOSIS — R76.8 OTHER SPECIFIED ABNORMAL IMMUNOLOGICAL FINDINGS IN SERUM: ICD-10-CM

## 2025-04-04 DIAGNOSIS — H05.10 UNSPECIFIED CHRONIC INFLAMMATORY DISORDERS OF ORBIT: ICD-10-CM

## 2025-04-04 PROCEDURE — 99214 OFFICE O/P EST MOD 30 MIN: CPT

## 2025-04-04 PROCEDURE — G2211 COMPLEX E/M VISIT ADD ON: CPT

## 2025-04-05 PROBLEM — R76.8 ELEVATED IGE LEVEL: Status: ACTIVE | Noted: 2025-04-05

## 2025-04-24 ENCOUNTER — NON-APPOINTMENT (OUTPATIENT)
Age: 35
End: 2025-04-24

## 2025-04-25 ENCOUNTER — APPOINTMENT (OUTPATIENT)
Dept: MRI IMAGING | Facility: CLINIC | Age: 35
End: 2025-04-25

## 2025-04-25 ENCOUNTER — APPOINTMENT (OUTPATIENT)
Dept: OTOLARYNGOLOGY | Facility: CLINIC | Age: 35
End: 2025-04-25
Payer: COMMERCIAL

## 2025-04-25 ENCOUNTER — OUTPATIENT (OUTPATIENT)
Dept: OUTPATIENT SERVICES | Facility: HOSPITAL | Age: 35
LOS: 1 days | End: 2025-04-25

## 2025-04-25 VITALS
BODY MASS INDEX: 23.49 KG/M2 | DIASTOLIC BLOOD PRESSURE: 73 MMHG | SYSTOLIC BLOOD PRESSURE: 124 MMHG | HEIGHT: 65 IN | HEART RATE: 43 BPM | OXYGEN SATURATION: 98 % | WEIGHT: 141 LBS

## 2025-04-25 DIAGNOSIS — J34.89 OTHER SPECIFIED DISORDERS OF NOSE AND NASAL SINUSES: ICD-10-CM

## 2025-04-25 DIAGNOSIS — Z98.890 OTHER SPECIFIED POSTPROCEDURAL STATES: ICD-10-CM

## 2025-04-25 PROCEDURE — 31231 NASAL ENDOSCOPY DX: CPT

## 2025-04-25 PROCEDURE — 99213 OFFICE O/P EST LOW 20 MIN: CPT | Mod: 25

## 2025-04-29 ENCOUNTER — APPOINTMENT (OUTPATIENT)
Dept: MRI IMAGING | Facility: CLINIC | Age: 35
End: 2025-04-29

## 2025-04-29 ENCOUNTER — APPOINTMENT (OUTPATIENT)
Dept: CT IMAGING | Facility: CLINIC | Age: 35
End: 2025-04-29

## 2025-04-29 ENCOUNTER — NON-APPOINTMENT (OUTPATIENT)
Age: 35
End: 2025-04-29

## 2025-04-29 PROCEDURE — 70540 MRI ORBIT/FACE/NECK W/O DYE: CPT

## 2025-04-29 PROCEDURE — 70486 CT MAXILLOFACIAL W/O DYE: CPT

## 2025-05-02 ENCOUNTER — NON-APPOINTMENT (OUTPATIENT)
Age: 35
End: 2025-05-02

## 2025-05-02 LAB — BETA-2 TRANSFERRIN: NEGATIVE

## 2025-05-07 ENCOUNTER — NON-APPOINTMENT (OUTPATIENT)
Age: 35
End: 2025-05-07

## 2025-05-27 ENCOUNTER — NON-APPOINTMENT (OUTPATIENT)
Age: 35
End: 2025-05-27

## 2025-05-27 ENCOUNTER — APPOINTMENT (OUTPATIENT)
Dept: OPHTHALMOLOGY | Facility: CLINIC | Age: 35
End: 2025-05-27
Payer: COMMERCIAL

## 2025-05-27 PROCEDURE — 99214 OFFICE O/P EST MOD 30 MIN: CPT

## 2025-05-27 PROCEDURE — 92285 EXTERNAL OCULAR PHOTOGRAPHY: CPT

## 2025-05-29 ENCOUNTER — APPOINTMENT (OUTPATIENT)
Dept: RHEUMATOLOGY | Facility: CLINIC | Age: 35
End: 2025-05-29
Payer: COMMERCIAL

## 2025-05-29 VITALS
SYSTOLIC BLOOD PRESSURE: 113 MMHG | BODY MASS INDEX: 23.32 KG/M2 | WEIGHT: 140 LBS | DIASTOLIC BLOOD PRESSURE: 70 MMHG | HEIGHT: 65 IN | OXYGEN SATURATION: 98 % | HEART RATE: 84 BPM

## 2025-05-29 DIAGNOSIS — Z79.899 OTHER LONG TERM (CURRENT) DRUG THERAPY: ICD-10-CM

## 2025-05-29 DIAGNOSIS — H05.10 UNSPECIFIED CHRONIC INFLAMMATORY DISORDERS OF ORBIT: ICD-10-CM

## 2025-05-29 DIAGNOSIS — D89.84 IGG4-RELATED DISEASE: ICD-10-CM

## 2025-05-29 PROCEDURE — 99215 OFFICE O/P EST HI 40 MIN: CPT

## 2025-05-29 PROCEDURE — G2211 COMPLEX E/M VISIT ADD ON: CPT

## 2025-05-30 RX ORDER — ACETAMINOPHEN 325 MG/1
325 TABLET ORAL
Refills: 0 | Status: ACTIVE | OUTPATIENT
Start: 2025-05-30

## 2025-05-30 RX ORDER — CETIRIZINE HYDROCHLORIDE 10 MG/1
10 TABLET, FILM COATED ORAL DAILY
Refills: 0 | Status: ACTIVE | OUTPATIENT
Start: 2025-05-30

## 2025-05-30 RX ORDER — RITUXIMAB 10 MG/ML
500 INJECTION, SOLUTION INTRAVENOUS
Refills: 0 | Status: ACTIVE | OUTPATIENT
Start: 2025-05-30

## 2025-05-30 RX ORDER — METHYLPREDNISOLONE SODIUM SUCCINATE 125 MG/2ML
125 INJECTION, POWDER, LYOPHILIZED, FOR SOLUTION INTRAMUSCULAR; INTRAVENOUS
Refills: 0 | Status: ACTIVE | OUTPATIENT
Start: 2025-05-30

## 2025-05-30 RX ADMIN — CETIRIZINE HYDROCHLORIDE 0 MG: 10 TABLET, FILM COATED ORAL at 00:00

## 2025-05-30 RX ADMIN — METHYLPREDNISOLONE SODIUM SUCCINATE 0 MG: 125 INJECTION, POWDER, LYOPHILIZED, FOR SOLUTION INTRAMUSCULAR; INTRAVENOUS at 00:00

## 2025-05-30 RX ADMIN — ACETAMINOPHEN 0 MG: 325 TABLET ORAL at 00:00

## 2025-05-30 RX ADMIN — RITUXIMAB 0 MG/50ML: 10 INJECTION, SOLUTION INTRAVENOUS at 00:00

## 2025-06-02 LAB
ALBUMIN SERPL ELPH-MCNC: 4.4 G/DL
ALP BLD-CCNC: 42 U/L
ALT SERPL-CCNC: 17 U/L
ANION GAP SERPL CALC-SCNC: 10 MMOL/L
APPEARANCE: CLEAR
AST SERPL-CCNC: 20 U/L
BACTERIA: ABNORMAL /HPF
BASOPHILS # BLD AUTO: 0.11 K/UL
BASOPHILS NFR BLD AUTO: 1.5 %
BILIRUB SERPL-MCNC: 0.2 MG/DL
BILIRUBIN URINE: NEGATIVE
BLOOD URINE: ABNORMAL
BUN SERPL-MCNC: 19 MG/DL
CALCIUM SERPL-MCNC: 9.7 MG/DL
CAST: 0 /LPF
CHLORIDE SERPL-SCNC: 103 MMOL/L
CO2 SERPL-SCNC: 25 MMOL/L
COLOR: YELLOW
CREAT SERPL-MCNC: 0.67 MG/DL
CREAT SPEC-SCNC: 113 MG/DL
CREAT/PROT UR: 0.1 RATIO
CRP SERPL-MCNC: <3 MG/L
EGFRCR SERPLBLD CKD-EPI 2021: 118 ML/MIN/1.73M2
EOSINOPHIL # BLD AUTO: 0.74 K/UL
EOSINOPHIL NFR BLD AUTO: 9.9 %
EPITHELIAL CELLS: 5 /HPF
ERYTHROCYTE [SEDIMENTATION RATE] IN BLOOD BY WESTERGREN METHOD: 3 MM/HR
GLUCOSE QUALITATIVE U: NEGATIVE MG/DL
GLUCOSE SERPL-MCNC: 94 MG/DL
HBV CORE IGG+IGM SER QL: NONREACTIVE
HBV CORE IGM SER QL: NONREACTIVE
HBV SURFACE AB SER QL: REACTIVE
HBV SURFACE AG SER QL: NONREACTIVE
HCT VFR BLD CALC: 39.2 %
HCV AB SER QL: NONREACTIVE
HCV S/CO RATIO: 0.1 S/CO
HGB BLD-MCNC: 12.8 G/DL
IGG SERPL-MCNC: 996 MG/DL
IGG1 SER-MCNC: 426 MG/DL
IGG2 SER-MCNC: 333 MG/DL
IGG3 SER-MCNC: 60.7 MG/DL
IGG4 SER-MCNC: 186.9 MG/DL
IMM GRANULOCYTES NFR BLD AUTO: 0.3 %
KETONES URINE: NEGATIVE MG/DL
LEUKOCYTE ESTERASE URINE: NEGATIVE
LYMPHOCYTES # BLD AUTO: 1.9 K/UL
LYMPHOCYTES NFR BLD AUTO: 25.3 %
MAN DIFF?: NORMAL
MCHC RBC-ENTMCNC: 31.4 PG
MCHC RBC-ENTMCNC: 32.7 G/DL
MCV RBC AUTO: 96.1 FL
MICROSCOPIC-UA: NORMAL
MONOCYTES # BLD AUTO: 0.57 K/UL
MONOCYTES NFR BLD AUTO: 7.6 %
NEUTROPHILS # BLD AUTO: 4.16 K/UL
NEUTROPHILS NFR BLD AUTO: 55.4 %
NITRITE URINE: NEGATIVE
PH URINE: 6
PLATELET # BLD AUTO: 157 K/UL
POTASSIUM SERPL-SCNC: 4.4 MMOL/L
PROT SERPL-MCNC: 6.8 G/DL
PROT UR-MCNC: 6 MG/DL
PROTEIN URINE: NEGATIVE MG/DL
RBC # BLD: 4.08 M/UL
RBC # FLD: 13 %
RED BLOOD CELLS URINE: 0 /HPF
SODIUM SERPL-SCNC: 139 MMOL/L
SPECIFIC GRAVITY URINE: 1.02
UROBILINOGEN URINE: 0.2 MG/DL
WBC # FLD AUTO: 7.5 K/UL
WHITE BLOOD CELLS URINE: 1 /HPF

## 2025-06-04 LAB
M TB IFN-G BLD-IMP: NEGATIVE
QUANTIFERON TB PLUS MITOGEN MINUS NIL: >10 IU/ML
QUANTIFERON TB PLUS NIL: 0.04 IU/ML
QUANTIFERON TB PLUS TB1 MINUS NIL: 0.01 IU/ML
QUANTIFERON TB PLUS TB2 MINUS NIL: 0.01 IU/ML

## 2025-06-23 ENCOUNTER — RX RENEWAL (OUTPATIENT)
Age: 35
End: 2025-06-23

## 2025-07-11 ENCOUNTER — APPOINTMENT (OUTPATIENT)
Dept: RHEUMATOLOGY | Facility: CLINIC | Age: 35
End: 2025-07-11
Payer: COMMERCIAL

## 2025-07-11 VITALS
SYSTOLIC BLOOD PRESSURE: 106 MMHG | BODY MASS INDEX: 23.49 KG/M2 | HEIGHT: 65 IN | HEART RATE: 74 BPM | WEIGHT: 141 LBS | DIASTOLIC BLOOD PRESSURE: 71 MMHG | OXYGEN SATURATION: 98 %

## 2025-07-11 PROCEDURE — 99215 OFFICE O/P EST HI 40 MIN: CPT

## 2025-07-11 PROCEDURE — G2211 COMPLEX E/M VISIT ADD ON: CPT

## 2025-07-26 ENCOUNTER — OUTPATIENT (OUTPATIENT)
Dept: OUTPATIENT SERVICES | Facility: HOSPITAL | Age: 35
LOS: 1 days | End: 2025-07-26
Payer: COMMERCIAL

## 2025-07-26 ENCOUNTER — APPOINTMENT (OUTPATIENT)
Dept: MRI IMAGING | Facility: CLINIC | Age: 35
End: 2025-07-26
Payer: COMMERCIAL

## 2025-07-26 DIAGNOSIS — D89.84 IGG4-RELATED DISEASE: ICD-10-CM

## 2025-07-26 DIAGNOSIS — Z00.8 ENCOUNTER FOR OTHER GENERAL EXAMINATION: ICD-10-CM

## 2025-07-26 PROCEDURE — 70543 MRI ORBT/FAC/NCK W/O &W/DYE: CPT

## 2025-07-26 PROCEDURE — A9585: CPT

## 2025-07-26 PROCEDURE — 70543 MRI ORBT/FAC/NCK W/O &W/DYE: CPT | Mod: 26

## 2025-08-07 ENCOUNTER — TRANSCRIPTION ENCOUNTER (OUTPATIENT)
Age: 35
End: 2025-08-07

## 2025-08-07 ENCOUNTER — NON-APPOINTMENT (OUTPATIENT)
Age: 35
End: 2025-08-07

## 2025-08-07 DIAGNOSIS — M30.1 POLYARTERITIS WITH LUNG INVOLVEMENT [CHURG-STRAUSS]: ICD-10-CM

## 2025-08-07 DIAGNOSIS — D72.18 POLYARTERITIS WITH LUNG INVOLVEMENT [CHURG-STRAUSS]: ICD-10-CM

## 2025-08-08 ENCOUNTER — NON-APPOINTMENT (OUTPATIENT)
Age: 35
End: 2025-08-08

## 2025-08-08 ENCOUNTER — APPOINTMENT (OUTPATIENT)
Dept: RHEUMATOLOGY | Facility: CLINIC | Age: 35
End: 2025-08-08

## 2025-08-14 RX ORDER — MEPOLIZUMAB 100 MG/ML
100 INJECTION, SOLUTION SUBCUTANEOUS
Qty: 9 | Refills: 1 | Status: ACTIVE | COMMUNITY
Start: 2025-08-07 | End: 1900-01-01

## 2025-08-22 ENCOUNTER — APPOINTMENT (OUTPATIENT)
Dept: RHEUMATOLOGY | Facility: CLINIC | Age: 35
End: 2025-08-22

## 2025-08-22 ENCOUNTER — OUTPATIENT (OUTPATIENT)
Dept: OUTPATIENT SERVICES | Facility: HOSPITAL | Age: 35
LOS: 1 days | End: 2025-08-22

## 2025-08-22 ENCOUNTER — APPOINTMENT (OUTPATIENT)
Dept: INTERNAL MEDICINE | Facility: CLINIC | Age: 35
End: 2025-08-22

## (undated) DEVICE — DRAPE INSTRUMENT POUCH 6.75" X 11"

## (undated) DEVICE — WARMING BLANKET LOWER ADULT

## (undated) DEVICE — SUT CHROMIC 4-0 18" G-2

## (undated) DEVICE — Device

## (undated) DEVICE — SPECIMEN CONTAINER 100ML

## (undated) DEVICE — DRSG TELFA 3 X 8

## (undated) DEVICE — MEDTRONIC INSTRUMENT TRACKER ENT

## (undated) DEVICE — DRSG NASOPORE 8CM STANDARD

## (undated) DEVICE — DRSG NASOPORE 4CM FIRM

## (undated) DEVICE — MEDTRONIC AXIEM PATIENT TRACKER NON-INVASIVE

## (undated) DEVICE — MEDICATION LABELS W MARKER

## (undated) DEVICE — ELCTR BOVIE TIP NEEDLE INSULATED 2.8" EDGE

## (undated) DEVICE — APPLICATOR COTTON TIP 3" STERILE

## (undated) DEVICE — PACK NASAL

## (undated) DEVICE — SUT MONOSOF 3-0 18" C-14

## (undated) DEVICE — BLADE MEDTRONIC ENT TRICUT NON-ROTATABLE STRAIGHT 4MM X 13CM

## (undated) DEVICE — ELCTR BOVIE SUCTION 11FR 8"

## (undated) DEVICE — NDL HYPO REGULAR BEVEL 25G X 1.5" (BLUE)

## (undated) DEVICE — SOL IRR POUR H2O 250ML

## (undated) DEVICE — BLADE SCALPEL SAFETYLOCK #15

## (undated) DEVICE — SOL IRR POUR NS 0.9% 500ML

## (undated) DEVICE — SUCTION COAGULATOR HAND CONTROL 10FR X 6"

## (undated) DEVICE — BLADE MEDTRONIC ENT INFERIOR TURBINATE ROTATABLE STRAIGHT 2MM X11CM

## (undated) DEVICE — CATH IV SAFE INSYTE 14G X 1.75" (ORANGE)

## (undated) DEVICE — DRAPE MAGNETIC INSTRUMENT MEDIUM

## (undated) DEVICE — POSITIONER FOAM EGG CRATE ULNAR 2PCS (PINK)

## (undated) DEVICE — DRAPE MAYO STAND 30"

## (undated) DEVICE — BLADE MEDTRONIC ENT FUSION TRICUT ROTATABLE STRAIGHT 4MM X 13CM

## (undated) DEVICE — SOL ANTI FOG (FRED)

## (undated) DEVICE — VENODYNE/SCD SLEEVE CALF MEDIUM

## (undated) DEVICE — TUBING SUCTION 20FT

## (undated) DEVICE — SYR LUER LOK 10CC

## (undated) DEVICE — BASIN AMBULATORY

## (undated) DEVICE — ELCTR BVI ACCU-TEMP CAUTERY SHAFT FINE TIP 1/2"

## (undated) DEVICE — MARKING PEN W RULER

## (undated) DEVICE — BLADE MEDTRONIC ENT INFERIOR TURBINATE ROTATABLE STRAIGHT 2.9MM X 11CM